# Patient Record
Sex: FEMALE | Race: WHITE | NOT HISPANIC OR LATINO | Employment: OTHER | URBAN - METROPOLITAN AREA
[De-identification: names, ages, dates, MRNs, and addresses within clinical notes are randomized per-mention and may not be internally consistent; named-entity substitution may affect disease eponyms.]

---

## 2017-01-03 DIAGNOSIS — I10 BENIGN ESSENTIAL HYPERTENSION: ICD-10-CM

## 2017-01-03 RX ORDER — AMLODIPINE AND OLMESARTAN MEDOXOMIL 10; 40 MG/1; MG/1
TABLET ORAL
Qty: 90 TABLET | Refills: 1 | Status: SHIPPED | OUTPATIENT
Start: 2017-01-03 | End: 2017-07-02 | Stop reason: SDUPTHER

## 2017-01-09 RX ORDER — BLOOD SUGAR DIAGNOSTIC
STRIP MISCELLANEOUS
Qty: 100 EACH | Refills: 1 | Status: SHIPPED | OUTPATIENT
Start: 2017-01-09 | End: 2020-07-08

## 2017-01-18 ENCOUNTER — OFFICE VISIT (OUTPATIENT)
Dept: INTERNAL MEDICINE | Facility: CLINIC | Age: 67
End: 2017-01-18

## 2017-01-18 VITALS
SYSTOLIC BLOOD PRESSURE: 118 MMHG | DIASTOLIC BLOOD PRESSURE: 74 MMHG | HEIGHT: 60 IN | HEART RATE: 68 BPM | BODY MASS INDEX: 32.83 KG/M2 | OXYGEN SATURATION: 98 % | WEIGHT: 167.2 LBS

## 2017-01-18 DIAGNOSIS — E78.2 MIXED HYPERLIPIDEMIA: ICD-10-CM

## 2017-01-18 DIAGNOSIS — K76.0 NONALCOHOLIC FATTY LIVER DISEASE: Primary | ICD-10-CM

## 2017-01-18 DIAGNOSIS — E11.9 DIABETES MELLITUS WITHOUT COMPLICATION (HCC): ICD-10-CM

## 2017-01-18 DIAGNOSIS — I10 BENIGN ESSENTIAL HYPERTENSION: ICD-10-CM

## 2017-01-18 DIAGNOSIS — E11.69 TYPE 2 DIABETES MELLITUS WITH OTHER SPECIFIED COMPLICATION (HCC): ICD-10-CM

## 2017-01-18 LAB
ALBUMIN SERPL-MCNC: 4.7 G/DL (ref 3.2–4.8)
ALBUMIN/GLOB SERPL: 1.8 G/DL (ref 1.5–2.5)
ALP SERPL-CCNC: 68 U/L (ref 25–100)
ALT SERPL W P-5'-P-CCNC: 15 U/L (ref 7–40)
ANION GAP SERPL CALCULATED.3IONS-SCNC: 12 MMOL/L (ref 3–11)
ARTICHOKE IGE QN: 65 MG/DL (ref 0–130)
AST SERPL-CCNC: 16 U/L (ref 0–33)
BILIRUB SERPL-MCNC: 0.7 MG/DL (ref 0.3–1.2)
BUN BLD-MCNC: 21 MG/DL (ref 9–23)
BUN/CREAT SERPL: 21 (ref 7–25)
CALCIUM SPEC-SCNC: 11.2 MG/DL (ref 8.7–10.4)
CHLORIDE SERPL-SCNC: 100 MMOL/L (ref 99–109)
CHOLEST SERPL-MCNC: 161 MG/DL (ref 0–200)
CO2 SERPL-SCNC: 29 MMOL/L (ref 20–31)
CREAT BLD-MCNC: 1 MG/DL (ref 0.6–1.3)
EXPIRATION DATE: ABNORMAL
GFR SERPL CREATININE-BSD FRML MDRD: 55 ML/MIN/1.73
GLOBULIN UR ELPH-MCNC: 2.6 GM/DL
GLUCOSE BLD-MCNC: 104 MG/DL (ref 70–100)
HBA1C MFR BLD: 5.4 %
HDLC SERPL-MCNC: 66 MG/DL (ref 40–60)
Lab: ABNORMAL
POTASSIUM BLD-SCNC: 4.5 MMOL/L (ref 3.5–5.5)
PROT SERPL-MCNC: 7.3 G/DL (ref 5.7–8.2)
SODIUM BLD-SCNC: 141 MMOL/L (ref 132–146)
TRIGL SERPL-MCNC: 115 MG/DL (ref 0–150)
TSH SERPL DL<=0.05 MIU/L-ACNC: 4.28 MIU/ML (ref 0.35–5.35)

## 2017-01-18 PROCEDURE — 80061 LIPID PANEL: CPT | Performed by: INTERNAL MEDICINE

## 2017-01-18 PROCEDURE — 80053 COMPREHEN METABOLIC PANEL: CPT | Performed by: INTERNAL MEDICINE

## 2017-01-18 PROCEDURE — 84443 ASSAY THYROID STIM HORMONE: CPT | Performed by: INTERNAL MEDICINE

## 2017-01-18 PROCEDURE — 83036 HEMOGLOBIN GLYCOSYLATED A1C: CPT | Performed by: INTERNAL MEDICINE

## 2017-01-18 PROCEDURE — 99214 OFFICE O/P EST MOD 30 MIN: CPT | Performed by: INTERNAL MEDICINE

## 2017-01-18 RX ORDER — METFORMIN HYDROCHLORIDE 500 MG/1
500 TABLET, EXTENDED RELEASE ORAL
Qty: 90 TABLET | Refills: 1 | Status: SHIPPED | OUTPATIENT
Start: 2017-01-18 | End: 2017-12-15 | Stop reason: SDUPTHER

## 2017-01-18 NOTE — MR AVS SNAPSHOT
Claire M Jose J   1/18/2017 11:30 AM   Office Visit    Dept Phone:  [phone]   Encounter #:  [id number]    Provider:  Rosemarie Marley MD   Department:  [facility] INTERNAL MEDICINE AND ENDOCRINOLOGY [place]                Your Full Care Plan              Where to Get Your Medications      These medications were sent to [facility] - [place], [place] - [address] [place] [address] - [phone]  - [phone] [address] [address]     Phone:  [phone]     metFORMIN  MG 24 hr tablet            Your Updated Medication List          This list is accurate as of: 1/18/17 12:25 PM.  Always use your most recent med list.                * ACCU-CHEK TINO PLUS test strip   Generic drug:  glucose blood       * ACCU-CHEK TINO PLUS test strip   Generic drug:  glucose blood   USE TO TEST DAILY       amlodipine-olmesartan 10-40 MG per tablet   Commonly known as:  JENNI   TAKE 1 TABLET DAILY FOR BLOOD PRESSURE       EQL FISH OIL 1000 MG capsule       metFORMIN  MG 24 hr tablet   Commonly known as:  GLUCOPHAGE-XR   Take 1 tablet by mouth Daily With Breakfast.       metoprolol succinate  MG 24 hr tablet   Commonly known as:  TOPROL-XL   Take 1 tablet by mouth Daily.       MULTI VITAMIN/MINERALS PO       naltrexone-bupropion ER 8-90 MG tablet   Commonly known as:  CONTRAVE   Take 1 tablet by mouth 2 (two) times a day.       niacin 500 MG tablet       pravastatin 40 MG tablet   Commonly known as:  PRAVACHOL   Take 1 tablet by mouth Daily.       psyllium 0.52 G capsule   Commonly known as:  METAMUCIL       ursodiol 300 MG capsule   Commonly known as:  ACTIGALL       Vitamin D-3 1000 UNITS capsule       Vitamin E 400 UNITS tablet       * Notice:  This list has 2 medication(s) that are the same as other medications prescribed for you. Read the directions carefully, and ask your doctor or other care provider to review them with you.            We  Performed the Following     Comprehensive Metabolic Panel     Lipid Panel     POC Glycated Hemoglobin, Total     TSH       You Were Diagnosed With        Codes Comments    Nonalcoholic fatty liver disease    -  Primary ICD-10-CM: K76.0  ICD-9-CM: 571.8     Diabetes mellitus without complication     ICD-10-CM: E11.9  ICD-9-CM: 250.00     Mixed hyperlipidemia     ICD-10-CM: E78.2  ICD-9-CM: 272.2     Benign essential hypertension     ICD-10-CM: I10  ICD-9-CM: 401.1     Type 2 diabetes mellitus with other specified complication     ICD-10-CM: E11.69  ICD-9-CM: 250.80       Instructions     None    Patient Instructions History      Upcoming Appointments     Visit Type Date Time Department    FOLLOW UP 2017 11:30 AM [facility]    SUBSEQUENT MEDICARE WELLNESS 2017 10:45 AM [facility] Signup     [facility] [employer] allows you to send messages to your doctor, view your test results, renew your prescriptions, schedule appointments, and more. To sign up, go to [email or web address] and click on the Sign Up Now link in the New User? box. Enter your [employer] Activation Code exactly as it appears below along with the last four digits of your Social Security Number and your Date of Birth () to complete the sign-up process. If you do not sign up before the expiration date, you must request a new code.    [employer] Activation Code: [email or web address]  Expires: 2017 12:25 PM    If you have questions, you can email [email or web address] or call [phone] to talk to our [employer] staff. Remember, [employer] is NOT to be used for urgent needs. For medical emergencies, dial 911.               Other Info from Your Visit           Your Appointments     2017 10:45 AM EDT   Subsequent Medicare Wellness with Rosemarie Marley MD   [facility] INTERNAL MEDICINE AND ENDOCRINOLOGY [place] (--)    [address] [address]   [phone]              Allergies     No  "Known Allergies      Reason for Visit     Hypertension     Diabetes           Vital Signs     Blood Pressure Pulse Height Weight Oxygen Saturation Body Mass Index    118/74 68 60\" (152.4 cm) 167 lb 3.2 oz (75.8 kg) 98% 32.65 kg/m2    Smoking Status                   Never Smoker           Problems and Diagnoses Noted     Benign essential hypertension    High cholesterol or triglycerides    Nonalcoholic fatty liver disease    Type 2 diabetes    Diabetes mellitus without complication          Results     POC Glycated Hemoglobin, Total      Component Value Standard Range & Units    Hemoglobin A1C 5.4 %    Lot Number [id number]     Expiration Date 6/2018                     "

## 2017-01-18 NOTE — PROGRESS NOTES
Hypertension and Diabetes    Subjective   Claire Lux is a 66 y.o. female is here today for follow-up.    Hypertension   This is a chronic problem. The current episode started more than 1 year ago. The problem is controlled. Pertinent negatives include no chest pain, headaches, palpitations or shortness of breath. Past treatments include ACE inhibitors and calcium channel blockers.   Hyperlipidemia   This is a chronic problem. The problem is controlled. Recent lipid tests were reviewed and are normal. Pertinent negatives include no chest pain, myalgias or shortness of breath. Current antihyperlipidemic treatment includes statins.   Diabetes   She presents for her follow-up diabetic visit. She has type 2 diabetes mellitus. Her disease course has been fluctuating. There are no hypoglycemic associated symptoms. Pertinent negatives for hypoglycemia include no confusion, dizziness or headaches. Pertinent negatives for diabetes include no chest pain. There are no diabetic complications.          Current Outpatient Prescriptions:   •  ACCU-CHEK TINO PLUS test strip, USE TO TEST DAILY, Disp: 100 each, Rfl: 1  •  amlodipine-olmesartan (JENNI) 10-40 MG per tablet, TAKE 1 TABLET DAILY FOR BLOOD PRESSURE, Disp: 90 tablet, Rfl: 1  •  Cholecalciferol (VITAMIN D-3) 1000 UNITS capsule, Take 2 tablets by mouth daily., Disp: , Rfl:   •  glucose blood (ACCU-CHEK TINO PLUS) test strip, Accu-Chek Tino Plus In Vitro Strip; Patient Sig: Accu-Chek Tino Plus In Vitro Strip USE TO TEST DAILY; 1; 0; 09-Nov-2012; Active, Disp: , Rfl:   •  metFORMIN XR (GLUCOPHAGE-XR) 500 MG 24 hr tablet, Take 1 tablet by mouth Daily With Breakfast., Disp: 90 tablet, Rfl: 1  •  metoprolol succinate XL (TOPROL-XL) 100 MG 24 hr tablet, Take 1 tablet by mouth Daily., Disp: 90 tablet, Rfl: 1  •  Multiple Vitamins-Minerals (MULTI VITAMIN/MINERALS PO), Take 1 tablet by mouth daily., Disp: , Rfl:   •  naltrexone-bupropion ER (CONTRAVE) 8-90 MG tablet, Take 1  "tablet by mouth 2 (two) times a day. (Patient taking differently: Take 2 tablets by mouth 2 (Two) Times a Day.), Disp: , Rfl:   •  niacin 500 MG tablet, Take 2 tablets by mouth 2 (two) times a day., Disp: , Rfl:   •  Omega-3 Fatty Acids (EQL FISH OIL) 1000 MG capsule, Take  by mouth daily., Disp: , Rfl:   •  pravastatin (PRAVACHOL) 40 MG tablet, Take 1 tablet by mouth Daily., Disp: 90 tablet, Rfl: 1  •  psyllium (METAMUCIL) 0.52 G capsule, Take 3 capsules by mouth 2 (two) times a day., Disp: , Rfl:   •  ursodiol (ACTIGALL) 300 MG capsule, Take 2 capsules by mouth 2 (two) times a day., Disp: , Rfl:   •  Vitamin E 400 UNITS tablet, Take 1 tablet by mouth daily., Disp: , Rfl:       The following portions of the patient's history were reviewed and updated as appropriate: allergies, current medications, past family history, past medical history, past social history, past surgical history and problem list.    Review of Systems   Constitutional: Negative.  Negative for chills and fever.   HENT: Negative for ear discharge, ear pain, sinus pressure and sore throat.    Respiratory: Negative for cough, chest tightness and shortness of breath.    Cardiovascular: Negative for chest pain, palpitations and leg swelling.   Gastrointestinal: Negative for diarrhea, nausea and vomiting.   Musculoskeletal: Negative for arthralgias, back pain and myalgias.   Neurological: Negative for dizziness, syncope and headaches.   Psychiatric/Behavioral: Negative for confusion and sleep disturbance.       Objective   Visit Vitals   • /74   • Pulse 68   • Ht 60\" (152.4 cm)   • Wt 167 lb 3.2 oz (75.8 kg)   • SpO2 98%  Comment: ra   • BMI 32.65 kg/m2     Physical Exam   Constitutional: She is oriented to person, place, and time. She appears well-developed and well-nourished.   HENT:   Head: Normocephalic and atraumatic.   Mouth/Throat: No oropharyngeal exudate.   Eyes: Conjunctivae are normal. Pupils are equal, round, and reactive to light. "   Neck: Neck supple. No thyromegaly present.   Cardiovascular: Normal rate and regular rhythm.    Pulmonary/Chest: Effort normal and breath sounds normal.   Abdominal: Soft. Bowel sounds are normal. She exhibits no distension. There is no tenderness.   Musculoskeletal: She exhibits no edema.   Neurological: She is alert and oriented to person, place, and time. No cranial nerve deficit.   Skin: Skin is warm and dry.   Psychiatric: She has a normal mood and affect. Judgment normal.   Nursing note and vitals reviewed.        Results for orders placed or performed in visit on 10/13/16   POC Glycated Hemoglobin, Total   Result Value Ref Range    Hemoglobin A1C 5.3 %    Lot Number [id number]     Expiration Date 3/2018              Assessment/Plan   Diagnoses and all orders for this visit:    Nonalcoholic fatty liver disease  -     Comprehensive Metabolic Panel    Diabetes mellitus without complication  -     metFORMIN XR (GLUCOPHAGE-XR) 500 MG 24 hr tablet; Take 1 tablet by mouth Daily With Breakfast.    Mixed hyperlipidemia  -     Comprehensive Metabolic Panel  -     Lipid Panel  -     TSH    Benign essential hypertension      Sugars excellent, continue current regimen.  Counselled to increase exercise as she does have weight gain.  NAFL monitored by Dr. Mccall, and had Hep C checked as a part of that workup.           Return in about 5 months (around 6/18/2017) for [facility] wellness.

## 2017-04-17 RX ORDER — PRAVASTATIN SODIUM 40 MG
TABLET ORAL
Qty: 90 TABLET | Refills: 0 | Status: SHIPPED | OUTPATIENT
Start: 2017-04-17 | End: 2017-07-16 | Stop reason: SDUPTHER

## 2017-06-11 DIAGNOSIS — I10 BENIGN ESSENTIAL HYPERTENSION: ICD-10-CM

## 2017-06-12 RX ORDER — METOPROLOL SUCCINATE 100 MG/1
TABLET, EXTENDED RELEASE ORAL
Qty: 90 TABLET | Refills: 0 | Status: SHIPPED | OUTPATIENT
Start: 2017-06-12 | End: 2017-09-10 | Stop reason: SDUPTHER

## 2017-07-02 DIAGNOSIS — I10 BENIGN ESSENTIAL HYPERTENSION: ICD-10-CM

## 2017-07-03 RX ORDER — AMLODIPINE AND OLMESARTAN MEDOXOMIL 10; 40 MG/1; MG/1
TABLET ORAL
Qty: 90 TABLET | Refills: 0 | Status: SHIPPED | OUTPATIENT
Start: 2017-07-03 | End: 2017-10-01 | Stop reason: SDUPTHER

## 2017-07-17 RX ORDER — PRAVASTATIN SODIUM 40 MG
TABLET ORAL
Qty: 90 TABLET | Refills: 0 | Status: SHIPPED | OUTPATIENT
Start: 2017-07-17 | End: 2017-10-15 | Stop reason: SDUPTHER

## 2017-09-10 DIAGNOSIS — I10 BENIGN ESSENTIAL HYPERTENSION: ICD-10-CM

## 2017-09-11 RX ORDER — METOPROLOL SUCCINATE 100 MG/1
TABLET, EXTENDED RELEASE ORAL
Qty: 90 TABLET | Refills: 0 | Status: SHIPPED | OUTPATIENT
Start: 2017-09-11 | End: 2017-12-10 | Stop reason: SDUPTHER

## 2017-09-21 ENCOUNTER — OFFICE VISIT (OUTPATIENT)
Dept: INTERNAL MEDICINE | Facility: CLINIC | Age: 67
End: 2017-09-21

## 2017-09-21 VITALS
BODY MASS INDEX: 35.35 KG/M2 | HEART RATE: 78 BPM | DIASTOLIC BLOOD PRESSURE: 78 MMHG | WEIGHT: 181 LBS | OXYGEN SATURATION: 99 % | SYSTOLIC BLOOD PRESSURE: 132 MMHG

## 2017-09-21 DIAGNOSIS — K76.0 NONALCOHOLIC FATTY LIVER DISEASE: ICD-10-CM

## 2017-09-21 DIAGNOSIS — E11.9 CONTROLLED TYPE 2 DIABETES MELLITUS WITHOUT COMPLICATION, WITHOUT LONG-TERM CURRENT USE OF INSULIN (HCC): Primary | ICD-10-CM

## 2017-09-21 DIAGNOSIS — E11.69 TYPE 2 DIABETES MELLITUS WITH OTHER SPECIFIED COMPLICATION (HCC): ICD-10-CM

## 2017-09-21 DIAGNOSIS — E78.2 MIXED HYPERLIPIDEMIA: ICD-10-CM

## 2017-09-21 DIAGNOSIS — E55.9 VITAMIN D DEFICIENCY: ICD-10-CM

## 2017-09-21 DIAGNOSIS — I10 BENIGN ESSENTIAL HYPERTENSION: ICD-10-CM

## 2017-09-21 DIAGNOSIS — E83.52 HYPERCALCEMIA: ICD-10-CM

## 2017-09-21 LAB
25(OH)D3 SERPL-MCNC: 58.1 NG/ML
ALBUMIN SERPL-MCNC: 4.8 G/DL (ref 3.2–4.8)
ALBUMIN/GLOB SERPL: 1.9 G/DL (ref 1.5–2.5)
ALP SERPL-CCNC: 74 U/L (ref 25–100)
ALT SERPL W P-5'-P-CCNC: 22 U/L (ref 7–40)
ANION GAP SERPL CALCULATED.3IONS-SCNC: 13 MMOL/L (ref 3–11)
ARTICHOKE IGE QN: 81 MG/DL (ref 0–130)
AST SERPL-CCNC: 22 U/L (ref 0–33)
BILIRUB BLD-MCNC: NEGATIVE MG/DL
BILIRUB SERPL-MCNC: 0.6 MG/DL (ref 0.3–1.2)
BUN BLD-MCNC: 17 MG/DL (ref 9–23)
BUN/CREAT SERPL: 18.9 (ref 7–25)
CALCIUM SPEC-SCNC: 10 MG/DL (ref 8.7–10.4)
CHLORIDE SERPL-SCNC: 103 MMOL/L (ref 99–109)
CHOLEST SERPL-MCNC: 164 MG/DL (ref 0–200)
CLARITY, POC: CLEAR
CO2 SERPL-SCNC: 25 MMOL/L (ref 20–31)
COLOR UR: YELLOW
CREAT BLD-MCNC: 0.9 MG/DL (ref 0.6–1.3)
DEPRECATED RDW RBC AUTO: 42.3 FL (ref 37–54)
ERYTHROCYTE [DISTWIDTH] IN BLOOD BY AUTOMATED COUNT: 12.9 % (ref 11.3–14.5)
GFR SERPL CREATININE-BSD FRML MDRD: 62 ML/MIN/1.73
GLOBULIN UR ELPH-MCNC: 2.5 GM/DL
GLUCOSE BLD-MCNC: 119 MG/DL (ref 70–100)
GLUCOSE BLDC GLUCOMTR-MCNC: 136 MG/DL (ref 70–130)
GLUCOSE UR STRIP-MCNC: NEGATIVE MG/DL
HBA1C MFR BLD: 5.9 %
HCT VFR BLD AUTO: 43.8 % (ref 34.5–44)
HDLC SERPL-MCNC: 62 MG/DL (ref 40–60)
HGB BLD-MCNC: 14.8 G/DL (ref 11.5–15.5)
KETONES UR QL: NEGATIVE
LEUKOCYTE EST, POC: NEGATIVE
MCH RBC QN AUTO: 30.3 PG (ref 27–31)
MCHC RBC AUTO-ENTMCNC: 33.8 G/DL (ref 32–36)
MCV RBC AUTO: 89.8 FL (ref 80–99)
NITRITE UR-MCNC: NEGATIVE MG/ML
PH UR: 8 [PH] (ref 5–8)
PLATELET # BLD AUTO: 277 10*3/MM3 (ref 150–450)
PMV BLD AUTO: 11.1 FL (ref 6–12)
POTASSIUM BLD-SCNC: 4.4 MMOL/L (ref 3.5–5.5)
PROT SERPL-MCNC: 7.3 G/DL (ref 5.7–8.2)
PROT UR STRIP-MCNC: NEGATIVE MG/DL
RBC # BLD AUTO: 4.88 10*6/MM3 (ref 3.89–5.14)
RBC # UR STRIP: NEGATIVE /UL
SODIUM BLD-SCNC: 141 MMOL/L (ref 132–146)
SP GR UR: 1.01 (ref 1–1.03)
TRIGL SERPL-MCNC: 162 MG/DL (ref 0–150)
TSH SERPL DL<=0.05 MIU/L-ACNC: 2.84 MIU/ML (ref 0.35–5.35)
UROBILINOGEN UR QL: NORMAL
WBC NRBC COR # BLD: 7.22 10*3/MM3 (ref 3.5–10.8)

## 2017-09-21 PROCEDURE — 80061 LIPID PANEL: CPT | Performed by: INTERNAL MEDICINE

## 2017-09-21 PROCEDURE — 82947 ASSAY GLUCOSE BLOOD QUANT: CPT | Performed by: INTERNAL MEDICINE

## 2017-09-21 PROCEDURE — 82306 VITAMIN D 25 HYDROXY: CPT | Performed by: INTERNAL MEDICINE

## 2017-09-21 PROCEDURE — 80053 COMPREHEN METABOLIC PANEL: CPT | Performed by: INTERNAL MEDICINE

## 2017-09-21 PROCEDURE — G0439 PPPS, SUBSEQ VISIT: HCPCS | Performed by: INTERNAL MEDICINE

## 2017-09-21 PROCEDURE — 99213 OFFICE O/P EST LOW 20 MIN: CPT | Performed by: INTERNAL MEDICINE

## 2017-09-21 PROCEDURE — 81003 URINALYSIS AUTO W/O SCOPE: CPT | Performed by: INTERNAL MEDICINE

## 2017-09-21 PROCEDURE — G0008 ADMIN INFLUENZA VIRUS VAC: HCPCS | Performed by: INTERNAL MEDICINE

## 2017-09-21 PROCEDURE — 85027 COMPLETE CBC AUTOMATED: CPT | Performed by: INTERNAL MEDICINE

## 2017-09-21 PROCEDURE — 90662 IIV NO PRSV INCREASED AG IM: CPT | Performed by: INTERNAL MEDICINE

## 2017-09-21 PROCEDURE — G0444 DEPRESSION SCREEN ANNUAL: HCPCS | Performed by: INTERNAL MEDICINE

## 2017-09-21 PROCEDURE — 83036 HEMOGLOBIN GLYCOSYLATED A1C: CPT | Performed by: INTERNAL MEDICINE

## 2017-09-21 PROCEDURE — 84443 ASSAY THYROID STIM HORMONE: CPT | Performed by: INTERNAL MEDICINE

## 2017-09-21 NOTE — PROGRESS NOTES
QUICK REFERENCE INFORMATION:  The ABCs of the Annual Wellness Visit    Subsequent Medicare Wellness Visit    HEALTH RISK ASSESSMENT    1950    Recent Hospitalizations:  No hospitalization(s) within the last year..        Current Medical Providers:  Patient Care Team:  Rosemarie Marley MD as PCP - General  Rosemarie Marley MD as PCP - Claims Attributed        Smoking Status:  History   Smoking Status   • Never Smoker   Smokeless Tobacco   • Never Used       Alcohol Consumption:  History   Alcohol Use No       Depression Screen:   PHQ-2/PHQ-9 Depression Screening 9/21/2017   Little interest or pleasure in doing things 0   Feeling down, depressed, or hopeless 0   Total Score 0       Health Habits and Functional and Cognitive Screening:  Functional & Cognitive Status 9/21/2017   Do you have difficulty preparing food and eating? No   Do you have difficulty bathing yourself? No   Do you have difficulty getting dressed? No   Do you have difficulty using the toilet? No   Do you have difficulty moving around from place to place? No   In the past year have you fallen or experienced a near fall? No   Do you need help using the phone?  No   Are you deaf or do you have serious difficulty hearing?  No   Do you need help with transportation? No   Do you need help shopping? No   Do you need help preparing meals?  No   Do you need help with housework?  No   Do you need help with laundry? No   Do you need help taking your medications? No   Do you need help managing money? No       Health Habits  Current Diet: Well Balanced Diet  Dental Exam: Up to date  Eye Exam: Up to date  Exercise (times per week): 3 times per week  Current Exercise Activities Include: Walking      Does the patient have evidence of cognitive impairment? No    Aspirin use counseling: Start ASA 81 mg daily       Recent Lab Results:  CMP:  Lab Results   Component Value Date    BUN 17 09/21/2017    CREATININE 0.90 09/21/2017    EGFRIFNONA 62 09/21/2017    BCR  18.9 09/21/2017     09/21/2017    K 4.4 09/21/2017    CO2 25.0 09/21/2017    CALCIUM 10.0 09/21/2017    ALBUMIN 4.80 09/21/2017    LABIL2 1.9 09/21/2017    BILITOT 0.6 09/21/2017    ALKPHOS 74 09/21/2017    AST 22 09/21/2017    ALT 22 09/21/2017     Lipid Panel:  Lab Results   Component Value Date    CHOL 164 09/21/2017    TRIG 162 (H) 09/21/2017    HDL 62 (H) 09/21/2017    LDLDIRECT 81 09/21/2017     HbA1c:  Lab Results   Component Value Date    HGBA1C 5.9 09/21/2017       Visual Acuity:  No exam data present    Age-appropriate Screening Schedule:  Refer to the list below for future screening recommendations based on patient's age, sex and/or medical conditions. Orders for these recommended tests are listed in the plan section. The patient has been provided with a written plan.    Health Maintenance   Topic Date Due   • HEMOGLOBIN A1C  03/21/2018   • DIABETIC EYE EXAM  06/10/2018   • MAMMOGRAM  07/21/2018   • DIABETIC FOOT EXAM  09/21/2018   • LIPID PANEL  09/21/2018   • URINE MICROALBUMIN  09/21/2018   • COLONOSCOPY  08/10/2020   • INFLUENZA VACCINE  Completed   • PNEUMOCOCCAL VACCINES (65+ LOW/MEDIUM RISK)  Completed   • ZOSTER VACCINE  Addressed   • TDAP/TD VACCINES  Excluded        Subjective   History of Present Illness    Claire Lux is a 66 y.o. female who presents for an Subsequent Wellness Visit.and f/u on her DM2, HTN, HLP and SMALLS.    The following portions of the patient's history were reviewed and updated as appropriate: allergies, current medications, past family history, past medical history, past social history, past surgical history and problem list.    Outpatient Medications Prior to Visit   Medication Sig Dispense Refill   • ACCU-CHEK TINO PLUS test strip USE TO TEST DAILY 100 each 1   • amlodipine-olmesartan (JENNI) 10-40 MG per tablet TAKE 1 TABLET DAILY FOR BLOOD PRESSURE 90 tablet 0   • Cholecalciferol (VITAMIN D-3) 1000 UNITS capsule Take 2 tablets by mouth daily.     • glucose  blood (ACCU-CHEK TINO PLUS) test strip Accu-Chek Tino Plus In Vitro Strip; Patient Sig: Accu-Chek Tino Plus In Vitro Strip USE TO TEST DAILY; 1; 0; 09-Nov-2012; Active     • metFORMIN XR (GLUCOPHAGE-XR) 500 MG 24 hr tablet Take 1 tablet by mouth Daily With Breakfast. 90 tablet 1   • metoprolol succinate XL (TOPROL-XL) 100 MG 24 hr tablet TAKE 1 TABLET DAILY 90 tablet 0   • Multiple Vitamins-Minerals (MULTI VITAMIN/MINERALS PO) Take 1 tablet by mouth daily.     • naltrexone-bupropion ER (CONTRAVE) 8-90 MG tablet Take 1 tablet by mouth 2 (two) times a day. (Patient taking differently: Take 2 tablets by mouth 2 (Two) Times a Day.)     • niacin 500 MG tablet Take 2 tablets by mouth 2 (two) times a day.     • Omega-3 Fatty Acids (EQL FISH OIL) 1000 MG capsule Take  by mouth daily.     • pravastatin (PRAVACHOL) 40 MG tablet TAKE 1 TABLET DAILY 90 tablet 0   • psyllium (METAMUCIL) 0.52 G capsule Take 3 capsules by mouth 2 (two) times a day.     • ursodiol (ACTIGALL) 300 MG capsule Take 2 capsules by mouth 2 (two) times a day.     • Vitamin E 400 UNITS tablet Take 1 tablet by mouth daily.       No facility-administered medications prior to visit.        Patient Active Problem List   Diagnosis   • Abnormal weight gain   • Benign essential hypertension   • Hypercalcemia   • Hyperlipidemia   • Nonalcoholic fatty liver disease   • Type 2 diabetes mellitus   • Postmenopausal disorder       Advance Care Planning:  has an advance directive - a copy has been provided and is in file    Identification of Risk Factors:  Risk factors include: weight , increased fall risk, cognitive impairment, hearing limitations and incontinence.    Review of Systems   Constitutional: Negative.  Negative for chills and fever.   HENT: Negative for ear discharge, ear pain, sinus pressure and sore throat.    Respiratory: Negative for cough, chest tightness and shortness of breath.    Cardiovascular: Negative for chest pain, palpitations and leg  swelling.   Gastrointestinal: Negative for diarrhea, nausea and vomiting.   Musculoskeletal: Negative for arthralgias, back pain and myalgias.   Neurological: Negative for dizziness, syncope and headaches.   Psychiatric/Behavioral: Negative for confusion and sleep disturbance.       Compared to one year ago, the patient feels her physical health is better.  Compared to one year ago, the patient feels her mental health is better.    Objective     Physical Exam   Constitutional: She is oriented to person, place, and time. She appears well-developed and well-nourished.   HENT:   Head: Normocephalic and atraumatic.   Right Ear: External ear normal.   Left Ear: External ear normal.   Mouth/Throat: No oropharyngeal exudate.   Eyes: Conjunctivae are normal. Pupils are equal, round, and reactive to light.   Neck: Neck supple. No thyromegaly present.   Cardiovascular: Normal rate, regular rhythm and intact distal pulses.    Pulmonary/Chest: Effort normal and breath sounds normal.   Abdominal: Soft. Bowel sounds are normal. She exhibits no distension. There is no tenderness.   Musculoskeletal: She exhibits no edema.   Neurological: She is alert and oriented to person, place, and time. No cranial nerve deficit.   Skin: Skin is warm and dry.   Psychiatric: She has a normal mood and affect. Judgment normal.   Nursing note and vitals reviewed.      Vitals:    09/21/17 1139   BP: 132/78   Pulse: 78   SpO2: 99%   Weight: 181 lb (82.1 kg)       Body mass index is 35.35 kg/(m^2).  Discussed the patient's BMI with her. The BMI is in the acceptable range.    Claire was seen today for subsequent medicare wellness.    Diagnoses and all orders for this visit:    Controlled type 2 diabetes mellitus without complication, without long-term current use of insulin  -     POC Glycosylated Hemoglobin (Hb A1C)  -     POCT Glucose  -     POC Urinalysis Dipstick, Automated    Benign essential hypertension  -     POC Urinalysis Dipstick,  Automated    Mixed hyperlipidemia  -     Comprehensive Metabolic Panel  -     Lipid Panel  -     TSH  -     POC Urinalysis Dipstick, Automated    Nonalcoholic fatty liver disease  -     CBC (No Diff)  -     Comprehensive Metabolic Panel  -     POC Urinalysis Dipstick, Automated    Type 2 diabetes mellitus with other specified complication  -     POC Microalbumin  -     POC Urinalysis Dipstick, Automated    Hypercalcemia  -     Vitamin D 25 Hydroxy  -     POC Urinalysis Dipstick, Automated    Vitamin D deficiency  -     Vitamin D 25 Hydroxy  -     POC Urinalysis Dipstick, Automated    Other orders  -     Flu Vaccine High Dose PF 65YR+      Assessment/Plan   Patient Self-Management and Personalized Health Advice  The patient has been provided with information about: diet, exercise, weight management, designing advance directives and supplements and preventive services including:   · Diabetes screening, see lab orders, Exercise counseling provided, Fall Risk plan of care done, Glaucoma screening recommended, Influenza vaccine, Nutrition counseling provided, Urinary Incontinence assessment done.    Visit Diagnoses:    ICD-10-CM ICD-9-CM   1. Controlled type 2 diabetes mellitus without complication, without long-term current use of insulin E11.9 250.00   2. Benign essential hypertension I10 401.1   3. Mixed hyperlipidemia E78.2 272.2   4. Nonalcoholic fatty liver disease K76.0 571.8   5. Type 2 diabetes mellitus with other specified complication E11.69 250.80   6. Hypercalcemia E83.52 275.42   7. Vitamin D deficiency E55.9 268.9       Orders Placed This Encounter   Procedures   • Flu Vaccine High Dose PF 65YR+   • CBC (No Diff)   • Comprehensive Metabolic Panel   • Lipid Panel   • TSH   • Vitamin D 25 Hydroxy   • POC Glycosylated Hemoglobin (Hb A1C)   • POCT Glucose   • POC Microalbumin   • POC Urinalysis Dipstick, Automated       Outpatient Encounter Prescriptions as of 9/21/2017   Medication Sig Dispense Refill   •  ACCU-CHEK TINO PLUS test strip USE TO TEST DAILY 100 each 1   • amlodipine-olmesartan (JENNI) 10-40 MG per tablet TAKE 1 TABLET DAILY FOR BLOOD PRESSURE 90 tablet 0   • Cholecalciferol (VITAMIN D-3) 1000 UNITS capsule Take 2 tablets by mouth daily.     • glucose blood (ACCU-CHEK TINO PLUS) test strip Accu-Chek Tino Plus In Vitro Strip; Patient Sig: Accu-Chek Tino Plus In Vitro Strip USE TO TEST DAILY; 1; 0; 09-Nov-2012; Active     • metFORMIN XR (GLUCOPHAGE-XR) 500 MG 24 hr tablet Take 1 tablet by mouth Daily With Breakfast. 90 tablet 1   • metoprolol succinate XL (TOPROL-XL) 100 MG 24 hr tablet TAKE 1 TABLET DAILY 90 tablet 0   • Multiple Vitamins-Minerals (MULTI VITAMIN/MINERALS PO) Take 1 tablet by mouth daily.     • naltrexone-bupropion ER (CONTRAVE) 8-90 MG tablet Take 1 tablet by mouth 2 (two) times a day. (Patient taking differently: Take 2 tablets by mouth 2 (Two) Times a Day.)     • niacin 500 MG tablet Take 2 tablets by mouth 2 (two) times a day.     • Omega-3 Fatty Acids (EQL FISH OIL) 1000 MG capsule Take  by mouth daily.     • pravastatin (PRAVACHOL) 40 MG tablet TAKE 1 TABLET DAILY 90 tablet 0   • psyllium (METAMUCIL) 0.52 G capsule Take 3 capsules by mouth 2 (two) times a day.     • ursodiol (ACTIGALL) 300 MG capsule Take 2 capsules by mouth 2 (two) times a day.     • Vitamin E 400 UNITS tablet Take 1 tablet by mouth daily.       No facility-administered encounter medications on file as of 9/21/2017.        Reviewed use of high risk medication in the elderly: yes  Reviewed for potential of harmful drug interactions in the elderly: yes    Follow Up:  Return in about 5 months (around 2/21/2018) for Next scheduled follow up.     An After Visit Summary and PPPS with all of these plans were given to the patient.

## 2017-10-01 DIAGNOSIS — I10 BENIGN ESSENTIAL HYPERTENSION: ICD-10-CM

## 2017-10-01 RX ORDER — AMLODIPINE AND OLMESARTAN MEDOXOMIL 10; 40 MG/1; MG/1
TABLET ORAL
Qty: 90 TABLET | Refills: 0 | Status: SHIPPED | OUTPATIENT
Start: 2017-10-01 | End: 2017-12-30 | Stop reason: SDUPTHER

## 2017-10-15 RX ORDER — PRAVASTATIN SODIUM 40 MG
TABLET ORAL
Qty: 90 TABLET | Refills: 0 | Status: SHIPPED | OUTPATIENT
Start: 2017-10-15 | End: 2018-01-13 | Stop reason: SDUPTHER

## 2017-12-10 DIAGNOSIS — I10 BENIGN ESSENTIAL HYPERTENSION: ICD-10-CM

## 2017-12-10 RX ORDER — METOPROLOL SUCCINATE 100 MG/1
TABLET, EXTENDED RELEASE ORAL
Qty: 90 TABLET | Refills: 0 | Status: SHIPPED | OUTPATIENT
Start: 2017-12-10 | End: 2018-03-10 | Stop reason: SDUPTHER

## 2017-12-15 DIAGNOSIS — E11.9 DIABETES MELLITUS WITHOUT COMPLICATION (HCC): ICD-10-CM

## 2017-12-15 RX ORDER — METFORMIN HYDROCHLORIDE 500 MG/1
TABLET, EXTENDED RELEASE ORAL
Qty: 90 TABLET | Refills: 1 | Status: SHIPPED | OUTPATIENT
Start: 2017-12-15 | End: 2018-06-13 | Stop reason: SDUPTHER

## 2017-12-30 DIAGNOSIS — I10 BENIGN ESSENTIAL HYPERTENSION: ICD-10-CM

## 2017-12-30 RX ORDER — AMLODIPINE AND OLMESARTAN MEDOXOMIL 10; 40 MG/1; MG/1
TABLET ORAL
Qty: 90 TABLET | Refills: 0 | Status: SHIPPED | OUTPATIENT
Start: 2017-12-30 | End: 2018-04-27 | Stop reason: SDUPTHER

## 2018-01-14 RX ORDER — PRAVASTATIN SODIUM 40 MG
TABLET ORAL
Qty: 90 TABLET | Refills: 0 | Status: SHIPPED | OUTPATIENT
Start: 2018-01-14 | End: 2018-04-13 | Stop reason: SDUPTHER

## 2018-02-21 ENCOUNTER — OFFICE VISIT (OUTPATIENT)
Dept: INTERNAL MEDICINE | Facility: CLINIC | Age: 68
End: 2018-02-21

## 2018-02-21 VITALS
OXYGEN SATURATION: 99 % | WEIGHT: 184 LBS | SYSTOLIC BLOOD PRESSURE: 122 MMHG | HEART RATE: 74 BPM | HEIGHT: 60 IN | DIASTOLIC BLOOD PRESSURE: 84 MMHG | BODY MASS INDEX: 36.12 KG/M2

## 2018-02-21 DIAGNOSIS — K76.0 NONALCOHOLIC FATTY LIVER DISEASE: ICD-10-CM

## 2018-02-21 DIAGNOSIS — I10 BENIGN ESSENTIAL HYPERTENSION: ICD-10-CM

## 2018-02-21 DIAGNOSIS — E11.69 TYPE 2 DIABETES MELLITUS WITH OTHER SPECIFIED COMPLICATION, WITHOUT LONG-TERM CURRENT USE OF INSULIN (HCC): Primary | ICD-10-CM

## 2018-02-21 DIAGNOSIS — E78.2 MIXED HYPERLIPIDEMIA: ICD-10-CM

## 2018-02-21 LAB
ALBUMIN SERPL-MCNC: 4.6 G/DL (ref 3.2–4.8)
ALBUMIN/GLOB SERPL: 1.9 G/DL (ref 1.5–2.5)
ALP SERPL-CCNC: 69 U/L (ref 25–100)
ALT SERPL W P-5'-P-CCNC: 20 U/L (ref 7–40)
ANION GAP SERPL CALCULATED.3IONS-SCNC: 9 MMOL/L (ref 3–11)
ARTICHOKE IGE QN: 77 MG/DL (ref 0–130)
AST SERPL-CCNC: 20 U/L (ref 0–33)
BILIRUB SERPL-MCNC: 0.6 MG/DL (ref 0.3–1.2)
BUN BLD-MCNC: 21 MG/DL (ref 9–23)
BUN/CREAT SERPL: 23.3 (ref 7–25)
CALCIUM SPEC-SCNC: 10.1 MG/DL (ref 8.7–10.4)
CHLORIDE SERPL-SCNC: 105 MMOL/L (ref 99–109)
CHOLEST SERPL-MCNC: 152 MG/DL (ref 0–200)
CO2 SERPL-SCNC: 28 MMOL/L (ref 20–31)
CREAT BLD-MCNC: 0.9 MG/DL (ref 0.6–1.3)
GFR SERPL CREATININE-BSD FRML MDRD: 62 ML/MIN/1.73
GLOBULIN UR ELPH-MCNC: 2.4 GM/DL
GLUCOSE BLD-MCNC: 148 MG/DL (ref 70–100)
GLUCOSE BLDC GLUCOMTR-MCNC: 151 MG/DL (ref 70–130)
HBA1C MFR BLD: 5.4 %
HDLC SERPL-MCNC: 57 MG/DL (ref 40–60)
POTASSIUM BLD-SCNC: 4.3 MMOL/L (ref 3.5–5.5)
PROT SERPL-MCNC: 7 G/DL (ref 5.7–8.2)
SODIUM BLD-SCNC: 142 MMOL/L (ref 132–146)
TRIGL SERPL-MCNC: 166 MG/DL (ref 0–150)

## 2018-02-21 PROCEDURE — 80053 COMPREHEN METABOLIC PANEL: CPT | Performed by: INTERNAL MEDICINE

## 2018-02-21 PROCEDURE — 80061 LIPID PANEL: CPT | Performed by: INTERNAL MEDICINE

## 2018-02-21 PROCEDURE — 99214 OFFICE O/P EST MOD 30 MIN: CPT | Performed by: INTERNAL MEDICINE

## 2018-02-21 PROCEDURE — 83036 HEMOGLOBIN GLYCOSYLATED A1C: CPT | Performed by: INTERNAL MEDICINE

## 2018-02-21 PROCEDURE — 82947 ASSAY GLUCOSE BLOOD QUANT: CPT | Performed by: INTERNAL MEDICINE

## 2018-02-21 RX ORDER — PHENTERMINE HYDROCHLORIDE 37.5 MG/1
TABLET ORAL
Refills: 2 | COMMUNITY
Start: 2018-01-26 | End: 2018-10-24

## 2018-02-21 NOTE — PROGRESS NOTES
Follow-up (5 month); Diabetes (type 2); Hypertension; Hyperlipidemia; Nonalcoholic fatty liver disease; Abnormal Calcium; and Vitamin D Deficiency    Subjective   Claire Lux is a 67 y.o. female is here today for follow-up.    History of Present Illness   Follow up on her prediabetes. Non fasting, Glu was 96 this am.  She is on adipex for her weight due to her fatty liver.  Did not respond to contrave, hence on adipex by Dr. Mccall.    Current Outpatient Prescriptions:   •  ACCU-CHEK TINO PLUS test strip, USE TO TEST DAILY, Disp: 100 each, Rfl: 1  •  amlodipine-olmesartan (JENNI) 10-40 MG per tablet, TAKE 1 TABLET DAILY FOR BLOOD PRESSURE, Disp: 90 tablet, Rfl: 0  •  Cholecalciferol (VITAMIN D-3) 1000 UNITS capsule, Take 2 tablets by mouth daily., Disp: , Rfl:   •  glucose blood (ACCU-CHEK TINO PLUS) test strip, Accu-Chek Tino Plus In Vitro Strip; Patient Sig: Accu-Chek Tino Plus In Vitro Strip USE TO TEST DAILY; 1; 0; 09-Nov-2012; Active, Disp: , Rfl:   •  metFORMIN ER (GLUCOPHAGE-XR) 500 MG 24 hr tablet, TAKE 1 TABLET DAILY WITH BREAKFAST, Disp: 90 tablet, Rfl: 1  •  metoprolol succinate XL (TOPROL-XL) 100 MG 24 hr tablet, TAKE 1 TABLET DAILY, Disp: 90 tablet, Rfl: 0  •  Multiple Vitamins-Minerals (MULTI VITAMIN/MINERALS PO), Take 1 tablet by mouth daily., Disp: , Rfl:   •  niacin 500 MG tablet, Take 2 tablets by mouth 2 (two) times a day., Disp: , Rfl:   •  Omega-3 Fatty Acids (EQL FISH OIL) 1000 MG capsule, Take  by mouth daily., Disp: , Rfl:   •  pravastatin (PRAVACHOL) 40 MG tablet, TAKE 1 TABLET DAILY, Disp: 90 tablet, Rfl: 0  •  psyllium (METAMUCIL) 0.52 G capsule, Take 3 capsules by mouth 2 (two) times a day., Disp: , Rfl:   •  ursodiol (ACTIGALL) 300 MG capsule, Take 2 capsules by mouth 2 (two) times a day., Disp: , Rfl:   •  Vitamin E 400 UNITS tablet, Take 1 tablet by mouth daily., Disp: , Rfl:   •  phentermine (ADIPEX-P) 37.5 MG tablet, TAKE 1 TABLET DAILY, Disp: , Rfl: 2      The following  "portions of the patient's history were reviewed and updated as appropriate: allergies, current medications, past family history, past medical history, past social history, past surgical history and problem list.    Review of Systems   Constitutional: Positive for unexpected weight change. Negative for chills and fever.   HENT: Negative for ear discharge, ear pain, sinus pressure and sore throat.    Respiratory: Negative for cough, chest tightness and shortness of breath.    Cardiovascular: Negative for chest pain, palpitations and leg swelling.   Gastrointestinal: Negative for diarrhea, nausea and vomiting.   Musculoskeletal: Negative for arthralgias, back pain and myalgias.   Neurological: Negative for dizziness, syncope and headaches.   Psychiatric/Behavioral: Negative for confusion and sleep disturbance.       Objective   /84  Pulse 74  Ht 152.4 cm (60\")  Wt 83.5 kg (184 lb)  SpO2 99%  BMI 35.94 kg/m2  Physical Exam   Constitutional: She is oriented to person, place, and time. She appears well-developed and well-nourished.   HENT:   Head: Normocephalic and atraumatic.   Mouth/Throat: No oropharyngeal exudate.   Eyes: Conjunctivae are normal. Pupils are equal, round, and reactive to light.   Neck: Neck supple. No thyromegaly present.   Cardiovascular: Normal rate and regular rhythm.    Pulmonary/Chest: Effort normal and breath sounds normal.   Abdominal: Soft. Bowel sounds are normal. She exhibits no distension. There is no tenderness.   Musculoskeletal: She exhibits no edema.   Neurological: She is alert and oriented to person, place, and time. No cranial nerve deficit.   Skin: Skin is warm and dry.   Psychiatric: She has a normal mood and affect. Judgment normal.   Nursing note and vitals reviewed.        Results for orders placed or performed in visit on 02/21/18   POC Glycosylated Hemoglobin (Hb A1C)   Result Value Ref Range    Hemoglobin A1C 5.4 %   POC Glucose Fingerstick   Result Value Ref Range "    Glucose 151 (A) 70 - 130 mg/dL             Assessment/Plan   Diagnoses and all orders for this visit:    Type 2 diabetes mellitus with other specified complication, without long-term current use of insulin  -     POC Glycosylated Hemoglobin (Hb A1C)  -     POC Glucose Fingerstick    Nonalcoholic fatty liver disease  -     phentermine (ADIPEX-P) 37.5 MG tablet; TAKE 1 TABLET DAILY  -     Comprehensive Metabolic Panel    Benign essential hypertension    Mixed hyperlipidemia  -     Lipid Panel             Return in about 4 months (around 6/21/2018) for Next scheduled follow up and [facility] wellnesss in October..

## 2018-03-10 DIAGNOSIS — I10 BENIGN ESSENTIAL HYPERTENSION: ICD-10-CM

## 2018-03-10 RX ORDER — METOPROLOL SUCCINATE 100 MG/1
TABLET, EXTENDED RELEASE ORAL
Qty: 90 TABLET | Refills: 0 | Status: SHIPPED | OUTPATIENT
Start: 2018-03-10 | End: 2018-06-08 | Stop reason: SDUPTHER

## 2018-04-13 RX ORDER — PRAVASTATIN SODIUM 40 MG
TABLET ORAL
Qty: 90 TABLET | Refills: 0 | Status: SHIPPED | OUTPATIENT
Start: 2018-04-13 | End: 2018-07-12 | Stop reason: SDUPTHER

## 2018-04-27 DIAGNOSIS — I10 BENIGN ESSENTIAL HYPERTENSION: ICD-10-CM

## 2018-04-27 RX ORDER — AMLODIPINE AND OLMESARTAN MEDOXOMIL 10; 40 MG/1; MG/1
TABLET ORAL
Qty: 90 TABLET | Refills: 0 | Status: SHIPPED | OUTPATIENT
Start: 2018-04-27 | End: 2018-07-26 | Stop reason: SDUPTHER

## 2018-05-23 ENCOUNTER — OFFICE VISIT (OUTPATIENT)
Dept: INTERNAL MEDICINE | Facility: CLINIC | Age: 68
End: 2018-05-23

## 2018-05-23 VITALS
DIASTOLIC BLOOD PRESSURE: 68 MMHG | SYSTOLIC BLOOD PRESSURE: 126 MMHG | BODY MASS INDEX: 36.72 KG/M2 | HEART RATE: 89 BPM | OXYGEN SATURATION: 97 % | WEIGHT: 188 LBS

## 2018-05-23 DIAGNOSIS — E11.69 TYPE 2 DIABETES MELLITUS WITH OTHER SPECIFIED COMPLICATION, WITHOUT LONG-TERM CURRENT USE OF INSULIN (HCC): Primary | ICD-10-CM

## 2018-05-23 DIAGNOSIS — E83.52 HYPERCALCEMIA: ICD-10-CM

## 2018-05-23 DIAGNOSIS — K76.0 NONALCOHOLIC FATTY LIVER DISEASE: ICD-10-CM

## 2018-05-23 DIAGNOSIS — I10 BENIGN ESSENTIAL HYPERTENSION: ICD-10-CM

## 2018-05-23 DIAGNOSIS — E78.2 MIXED HYPERLIPIDEMIA: ICD-10-CM

## 2018-05-23 LAB
GLUCOSE BLDC GLUCOMTR-MCNC: 99 MG/DL (ref 70–130)
HBA1C MFR BLD: 5.7 %

## 2018-05-23 PROCEDURE — 83036 HEMOGLOBIN GLYCOSYLATED A1C: CPT | Performed by: INTERNAL MEDICINE

## 2018-05-23 PROCEDURE — 82947 ASSAY GLUCOSE BLOOD QUANT: CPT | Performed by: INTERNAL MEDICINE

## 2018-05-23 PROCEDURE — 99213 OFFICE O/P EST LOW 20 MIN: CPT | Performed by: INTERNAL MEDICINE

## 2018-05-23 NOTE — PROGRESS NOTES
Type 2 Diabetes Mellitus (follow up)    Subjective   Claire Lux is a 67 y.o. female is here today for follow-up.    History of Present Illness   Claire reports she is still on the Adipex for the fatty Liver.  To f/u with Dr. Mccall in July.  Reports she did not receive her letter last time.  Here for follow up on her DM2, HLP, HTN. Had some birthdays recently, had cake and dessert.    Current Outpatient Prescriptions:   •  ACCU-CHEK TINO PLUS test strip, USE TO TEST DAILY, Disp: 100 each, Rfl: 1  •  amlodipine-olmesartan (JENNI) 10-40 MG per tablet, TAKE 1 TABLET DAILY FOR BLOOD PRESSURE, Disp: 90 tablet, Rfl: 0  •  Cholecalciferol (VITAMIN D-3) 1000 UNITS capsule, Take 2 tablets by mouth daily., Disp: , Rfl:   •  glucose blood (ACCU-CHEK TINO PLUS) test strip, Accu-Chek Tino Plus In Vitro Strip; Patient Sig: Accu-Chek Tino Plus In Vitro Strip USE TO TEST DAILY; 1; 0; 09-Nov-2012; Active, Disp: , Rfl:   •  metFORMIN ER (GLUCOPHAGE-XR) 500 MG 24 hr tablet, TAKE 1 TABLET DAILY WITH BREAKFAST, Disp: 90 tablet, Rfl: 1  •  metoprolol succinate XL (TOPROL-XL) 100 MG 24 hr tablet, TAKE 1 TABLET DAILY, Disp: 90 tablet, Rfl: 0  •  Multiple Vitamins-Minerals (MULTI VITAMIN/MINERALS PO), Take 1 tablet by mouth daily., Disp: , Rfl:   •  niacin 500 MG tablet, Take 2 tablets by mouth 2 (two) times a day., Disp: , Rfl:   •  Omega-3 Fatty Acids (EQL FISH OIL) 1000 MG capsule, Take  by mouth daily., Disp: , Rfl:   •  phentermine (ADIPEX-P) 37.5 MG tablet, TAKE 1 TABLET DAILY, Disp: , Rfl: 2  •  pravastatin (PRAVACHOL) 40 MG tablet, TAKE 1 TABLET DAILY, Disp: 90 tablet, Rfl: 0  •  psyllium (METAMUCIL) 0.52 G capsule, Take 3 capsules by mouth 2 (two) times a day., Disp: , Rfl:   •  ursodiol (ACTIGALL) 300 MG capsule, Take 2 capsules by mouth 2 (two) times a day., Disp: , Rfl:   •  Vitamin E 400 UNITS tablet, Take 1 tablet by mouth daily., Disp: , Rfl:       The following portions of the patient's history were reviewed and  updated as appropriate: allergies, current medications, past family history, past medical history, past social history, past surgical history and problem list.    Review of Systems   Constitutional: Negative.  Negative for chills and fever.   HENT: Negative for ear discharge, ear pain, sinus pressure and sore throat.    Respiratory: Negative for cough, chest tightness and shortness of breath.    Cardiovascular: Negative for chest pain, palpitations and leg swelling.   Gastrointestinal: Negative for diarrhea, nausea and vomiting.   Musculoskeletal: Positive for gait problem. Negative for arthralgias, back pain and myalgias.   Neurological: Negative for dizziness, syncope and headaches.   Psychiatric/Behavioral: Negative for confusion and sleep disturbance.       Objective   /68   Pulse 89   Wt 85.3 kg (188 lb)   SpO2 97%   BMI 36.72 kg/m²   Physical Exam   Constitutional: She is oriented to person, place, and time. She appears well-developed and well-nourished.   HENT:   Head: Normocephalic and atraumatic.   Mouth/Throat: No oropharyngeal exudate.   Eyes: Conjunctivae are normal. Pupils are equal, round, and reactive to light.   Neck: Neck supple. No thyromegaly present.   Cardiovascular: Normal rate, regular rhythm and intact distal pulses.    Pulmonary/Chest: Effort normal and breath sounds normal.   Abdominal: Soft. Bowel sounds are normal. She exhibits no distension. There is no tenderness.   Musculoskeletal: She exhibits no edema.   Walking with a walker   Neurological: She is alert and oriented to person, place, and time. No cranial nerve deficit.   Skin: Skin is warm and dry.   Psychiatric: She has a normal mood and affect. Judgment normal.   Nursing note and vitals reviewed.        Results for orders placed or performed in visit on 05/23/18   POCT Glucose   Result Value Ref Range    Glucose 99 70 - 130 mg/dL   POC Glycosylated Hemoglobin (Hb A1C)   Result Value Ref Range    Hemoglobin A1C 5.7 %              Assessment/Plan   Diagnoses and all orders for this visit:    Type 2 diabetes mellitus with other specified complication, without long-term current use of insulin  -     POCT Glucose  -     POC Glycosylated Hemoglobin (Hb A1C)  -     Vitamin B12; Future  -     Hemoglobin A1c; Future    Benign essential hypertension  -     Comprehensive Metabolic Panel; Future    Mixed hyperlipidemia  -     Lipid Panel; Future  -     TSH; Future    Nonalcoholic fatty liver disease  -     CBC (No Diff); Future  -     Comprehensive Metabolic Panel; Future    Hypercalcemia  -     Vitamin D 25 Hydroxy; Future             Return for Next scheduled follow up.

## 2018-06-08 DIAGNOSIS — I10 BENIGN ESSENTIAL HYPERTENSION: ICD-10-CM

## 2018-06-08 RX ORDER — METOPROLOL SUCCINATE 100 MG/1
TABLET, EXTENDED RELEASE ORAL
Qty: 90 TABLET | Refills: 0 | Status: SHIPPED | OUTPATIENT
Start: 2018-06-08 | End: 2018-09-06 | Stop reason: SDUPTHER

## 2018-06-13 DIAGNOSIS — E11.9 DIABETES MELLITUS WITHOUT COMPLICATION (HCC): ICD-10-CM

## 2018-06-13 RX ORDER — METFORMIN HYDROCHLORIDE 500 MG/1
TABLET, EXTENDED RELEASE ORAL
Qty: 90 TABLET | Refills: 1 | Status: SHIPPED | OUTPATIENT
Start: 2018-06-13 | End: 2018-11-28 | Stop reason: SDUPTHER

## 2018-07-12 RX ORDER — PRAVASTATIN SODIUM 40 MG
TABLET ORAL
Qty: 90 TABLET | Refills: 0 | Status: SHIPPED | OUTPATIENT
Start: 2018-07-12 | End: 2018-10-10 | Stop reason: SDUPTHER

## 2018-07-26 DIAGNOSIS — I10 BENIGN ESSENTIAL HYPERTENSION: ICD-10-CM

## 2018-07-26 RX ORDER — AMLODIPINE AND OLMESARTAN MEDOXOMIL 10; 40 MG/1; MG/1
TABLET ORAL
Qty: 90 TABLET | Refills: 0 | Status: SHIPPED | OUTPATIENT
Start: 2018-07-26 | End: 2018-10-24 | Stop reason: SDUPTHER

## 2018-09-06 DIAGNOSIS — I10 BENIGN ESSENTIAL HYPERTENSION: ICD-10-CM

## 2018-09-06 RX ORDER — METOPROLOL SUCCINATE 100 MG/1
TABLET, EXTENDED RELEASE ORAL
Qty: 90 TABLET | Refills: 0 | Status: SHIPPED | OUTPATIENT
Start: 2018-09-06 | End: 2018-12-05 | Stop reason: SDUPTHER

## 2018-10-10 RX ORDER — PRAVASTATIN SODIUM 40 MG
TABLET ORAL
Qty: 90 TABLET | Refills: 1 | Status: SHIPPED | OUTPATIENT
Start: 2018-10-10 | End: 2019-04-08 | Stop reason: SDUPTHER

## 2018-10-19 ENCOUNTER — LAB (OUTPATIENT)
Dept: INTERNAL MEDICINE | Facility: CLINIC | Age: 68
End: 2018-10-19

## 2018-10-19 DIAGNOSIS — E78.2 MIXED HYPERLIPIDEMIA: ICD-10-CM

## 2018-10-19 DIAGNOSIS — E11.69 TYPE 2 DIABETES MELLITUS WITH OTHER SPECIFIED COMPLICATION, WITHOUT LONG-TERM CURRENT USE OF INSULIN (HCC): ICD-10-CM

## 2018-10-19 DIAGNOSIS — E83.52 HYPERCALCEMIA: ICD-10-CM

## 2018-10-19 DIAGNOSIS — I10 BENIGN ESSENTIAL HYPERTENSION: ICD-10-CM

## 2018-10-19 DIAGNOSIS — K76.0 NONALCOHOLIC FATTY LIVER DISEASE: ICD-10-CM

## 2018-10-19 LAB
25(OH)D3 SERPL-MCNC: 82.9 NG/ML
ALBUMIN SERPL-MCNC: 4.59 G/DL (ref 3.2–4.8)
ALBUMIN/GLOB SERPL: 2.7 G/DL (ref 1.5–2.5)
ALP SERPL-CCNC: 64 U/L (ref 25–100)
ALT SERPL W P-5'-P-CCNC: 26 U/L (ref 7–40)
ANION GAP SERPL CALCULATED.3IONS-SCNC: 5 MMOL/L (ref 3–11)
ARTICHOKE IGE QN: 64 MG/DL (ref 0–130)
AST SERPL-CCNC: 24 U/L (ref 0–33)
BILIRUB SERPL-MCNC: 0.5 MG/DL (ref 0.3–1.2)
BUN BLD-MCNC: 17 MG/DL (ref 9–23)
BUN/CREAT SERPL: 19.3 (ref 7–25)
CALCIUM SPEC-SCNC: 9.5 MG/DL (ref 8.7–10.4)
CHLORIDE SERPL-SCNC: 100 MMOL/L (ref 99–109)
CHOLEST SERPL-MCNC: 135 MG/DL (ref 0–200)
CO2 SERPL-SCNC: 32 MMOL/L (ref 20–31)
CREAT BLD-MCNC: 0.88 MG/DL (ref 0.6–1.3)
DEPRECATED RDW RBC AUTO: 44.3 FL (ref 37–54)
ERYTHROCYTE [DISTWIDTH] IN BLOOD BY AUTOMATED COUNT: 13.7 % (ref 11.3–14.5)
GFR SERPL CREATININE-BSD FRML MDRD: 64 ML/MIN/1.73
GLOBULIN UR ELPH-MCNC: 1.7 GM/DL
GLUCOSE BLD-MCNC: 97 MG/DL (ref 70–100)
HBA1C MFR BLD: 5.8 % (ref 4.8–5.6)
HCT VFR BLD AUTO: 43.8 % (ref 34.5–44)
HDLC SERPL-MCNC: 60 MG/DL (ref 40–60)
HGB BLD-MCNC: 14.5 G/DL (ref 11.5–15.5)
MCH RBC QN AUTO: 29.2 PG (ref 27–31)
MCHC RBC AUTO-ENTMCNC: 33.1 G/DL (ref 32–36)
MCV RBC AUTO: 88.3 FL (ref 80–99)
PLATELET # BLD AUTO: 256 10*3/MM3 (ref 150–450)
PMV BLD AUTO: 11.3 FL (ref 6–12)
POTASSIUM BLD-SCNC: 4.2 MMOL/L (ref 3.5–5.5)
PROT SERPL-MCNC: 6.3 G/DL (ref 5.7–8.2)
RBC # BLD AUTO: 4.96 10*6/MM3 (ref 3.89–5.14)
SODIUM BLD-SCNC: 137 MMOL/L (ref 132–146)
TRIGL SERPL-MCNC: 107 MG/DL (ref 0–150)
TSH SERPL DL<=0.05 MIU/L-ACNC: 3.01 MIU/ML (ref 0.35–5.35)
VIT B12 BLD-MCNC: 678 PG/ML (ref 211–911)
WBC NRBC COR # BLD: 6.52 10*3/MM3 (ref 3.5–10.8)

## 2018-10-19 PROCEDURE — 82607 VITAMIN B-12: CPT | Performed by: INTERNAL MEDICINE

## 2018-10-19 PROCEDURE — 80061 LIPID PANEL: CPT | Performed by: INTERNAL MEDICINE

## 2018-10-19 PROCEDURE — 80053 COMPREHEN METABOLIC PANEL: CPT | Performed by: INTERNAL MEDICINE

## 2018-10-19 PROCEDURE — 84443 ASSAY THYROID STIM HORMONE: CPT | Performed by: INTERNAL MEDICINE

## 2018-10-19 PROCEDURE — 82306 VITAMIN D 25 HYDROXY: CPT | Performed by: INTERNAL MEDICINE

## 2018-10-19 PROCEDURE — 85027 COMPLETE CBC AUTOMATED: CPT | Performed by: INTERNAL MEDICINE

## 2018-10-19 PROCEDURE — 83036 HEMOGLOBIN GLYCOSYLATED A1C: CPT | Performed by: INTERNAL MEDICINE

## 2018-10-24 ENCOUNTER — OFFICE VISIT (OUTPATIENT)
Dept: INTERNAL MEDICINE | Facility: CLINIC | Age: 68
End: 2018-10-24

## 2018-10-24 VITALS
SYSTOLIC BLOOD PRESSURE: 142 MMHG | DIASTOLIC BLOOD PRESSURE: 98 MMHG | HEART RATE: 73 BPM | OXYGEN SATURATION: 98 % | WEIGHT: 185.6 LBS | BODY MASS INDEX: 36.25 KG/M2

## 2018-10-24 DIAGNOSIS — I10 BENIGN ESSENTIAL HYPERTENSION: ICD-10-CM

## 2018-10-24 DIAGNOSIS — K76.0 NONALCOHOLIC FATTY LIVER DISEASE: ICD-10-CM

## 2018-10-24 DIAGNOSIS — E11.69 TYPE 2 DIABETES MELLITUS WITH OTHER SPECIFIED COMPLICATION, WITHOUT LONG-TERM CURRENT USE OF INSULIN (HCC): ICD-10-CM

## 2018-10-24 DIAGNOSIS — Z00.00 MEDICARE ANNUAL WELLNESS VISIT, SUBSEQUENT: Primary | ICD-10-CM

## 2018-10-24 LAB
POC CREATININE URINE: 50
POC MICROALBUMIN URINE: 10

## 2018-10-24 PROCEDURE — 82044 UR ALBUMIN SEMIQUANTITATIVE: CPT | Performed by: INTERNAL MEDICINE

## 2018-10-24 PROCEDURE — 82570 ASSAY OF URINE CREATININE: CPT | Performed by: INTERNAL MEDICINE

## 2018-10-24 PROCEDURE — 90662 IIV NO PRSV INCREASED AG IM: CPT | Performed by: INTERNAL MEDICINE

## 2018-10-24 PROCEDURE — G0008 ADMIN INFLUENZA VIRUS VAC: HCPCS | Performed by: INTERNAL MEDICINE

## 2018-10-24 PROCEDURE — G0439 PPPS, SUBSEQ VISIT: HCPCS | Performed by: INTERNAL MEDICINE

## 2018-10-24 RX ORDER — NALTREXONE HYDROCHLORIDE AND BUPROPION HYDROCHLORIDE 8; 90 MG/1; MG/1
2 TABLET, EXTENDED RELEASE ORAL 2 TIMES DAILY
Refills: 5 | COMMUNITY
Start: 2018-10-09 | End: 2020-07-08

## 2018-10-24 RX ORDER — AMLODIPINE AND OLMESARTAN MEDOXOMIL 10; 40 MG/1; MG/1
TABLET ORAL
Qty: 90 TABLET | Refills: 0 | Status: SHIPPED | OUTPATIENT
Start: 2018-10-24 | End: 2019-01-22 | Stop reason: SDUPTHER

## 2018-10-24 NOTE — PROGRESS NOTES
QUICK REFERENCE INFORMATION:  The ABCs of the Annual Wellness Visit    Subsequent Medicare Wellness Visit    HEALTH RISK ASSESSMENT    1950    Recent Hospitalizations:  No hospitalization(s) within the last year..        Current Medical Providers:  Patient Care Team:  Rosemarie Marley MD as PCP - General  Claire Mccall MD as PCP - Claims Attributed        Smoking Status:  History   Smoking Status   • Never Smoker   Smokeless Tobacco   • Never Used       Alcohol Consumption:  History   Alcohol Use No       Depression Screen:   PHQ-2/PHQ-9 Depression Screening 10/24/2018   Little interest or pleasure in doing things 0   Feeling down, depressed, or hopeless 0   Total Score 0       Health Habits and Functional and Cognitive Screening:  Functional & Cognitive Status 10/24/2018   Do you have difficulty preparing food and eating? No   Do you have difficulty bathing yourself, getting dressed or grooming yourself? No   Do you have difficulty using the toilet? No   Do you have difficulty moving around from place to place? No   Do you have trouble with steps or getting out of a bed or a chair? No   In the past year have you fallen or experienced a near fall? No   Current Diet Limited Junk Food   Dental Exam Not up to date   Eye Exam Up to date   Exercise (times per week) 0 times per week   Current Exercise Activities Include Walking   Do you need help using the phone?  No   Are you deaf or do you have serious difficulty hearing?  No   Do you need help with transportation? No   Do you need help shopping? No   Do you need help preparing meals?  No   Do you need help with housework?  No   Do you need help with laundry? No   Do you need help taking your medications? No   Do you need help managing money? No   Do you ever drive or ride in a car without wearing a seat belt? No   Have you felt unusual stress, anger or loneliness in the last month? No   Who do you live with? Sibling   If you need help, do you have trouble  finding someone available to you? No   Have you been bothered in the last four weeks by sexual problems? No           Does the patient have evidence of cognitive impairment? No    Aspirin use counseling: Taking ASA appropriately as indicated      Recent Lab Results:  CMP:  Lab Results   Component Value Date    BUN 17 10/19/2018    CREATININE 0.88 10/19/2018    EGFRIFNONA 64 10/19/2018    BCR 19.3 10/19/2018     10/19/2018    K 4.2 10/19/2018    CO2 32.0 (H) 10/19/2018    CALCIUM 9.5 10/19/2018    ALBUMIN 4.59 10/19/2018    BILITOT 0.5 10/19/2018    ALKPHOS 64 10/19/2018    AST 24 10/19/2018    ALT 26 10/19/2018     Lipid Panel:  Lab Results   Component Value Date    CHOL 135 10/19/2018    TRIG 107 10/19/2018    HDL 60 10/19/2018     HbA1c:  Lab Results   Component Value Date    HGBA1C 5.80 (H) 10/19/2018       Visual Acuity:  No exam data present    Age-appropriate Screening Schedule:  Refer to the list below for future screening recommendations based on patient's age, sex and/or medical conditions. Orders for these recommended tests are listed in the plan section. The patient has been provided with a written plan.    Health Maintenance   Topic Date Due   • ZOSTER VACCINE (2 of 2) 11/16/2017   • DIABETIC EYE EXAM  06/10/2018   • MAMMOGRAM  07/21/2018   • HEMOGLOBIN A1C  04/19/2019   • LIPID PANEL  10/19/2019   • DIABETIC FOOT EXAM  10/24/2019   • URINE MICROALBUMIN  10/24/2019   • COLONOSCOPY  08/10/2020   • INFLUENZA VACCINE  Completed   • PNEUMOCOCCAL VACCINES (65+ LOW/MEDIUM RISK)  Completed   • TDAP/TD VACCINES  Excluded        Subjective   History of Present Illness    Claire Lux is a 68 y.o. female who presents for an Subsequent Wellness Visit.    The following portions of the patient's history were reviewed and updated as appropriate: allergies, current medications, past family history, past medical history, past social history, past surgical history and problem list.    Outpatient Medications  Prior to Visit   Medication Sig Dispense Refill   • ACCU-CHEK TINO PLUS test strip USE TO TEST DAILY 100 each 1   • amlodipine-olmesartan (JENNI) 10-40 MG per tablet TAKE 1 TABLET DAILY FOR BLOOD PRESSURE 90 tablet 0   • Cholecalciferol (VITAMIN D-3) 1000 UNITS capsule Take 2 tablets by mouth daily.     • glucose blood (ACCU-CHEK TINO PLUS) test strip Accu-Chek Tino Plus In Vitro Strip; Patient Sig: Accu-Chek Tino Plus In Vitro Strip USE TO TEST DAILY; 1; 0; 09-Nov-2012; Active     • metFORMIN ER (GLUCOPHAGE-XR) 500 MG 24 hr tablet TAKE 1 TABLET DAILY WITH BREAKFAST 90 tablet 1   • metoprolol succinate XL (TOPROL-XL) 100 MG 24 hr tablet TAKE 1 TABLET DAILY 90 tablet 0   • Multiple Vitamins-Minerals (MULTI VITAMIN/MINERALS PO) Take 1 tablet by mouth daily.     • niacin 500 MG tablet Take 2 tablets by mouth 2 (two) times a day.     • Omega-3 Fatty Acids (EQL FISH OIL) 1000 MG capsule Take  by mouth daily.     • pravastatin (PRAVACHOL) 40 MG tablet TAKE 1 TABLET DAILY 90 tablet 1   • psyllium (METAMUCIL) 0.52 G capsule Take 3 capsules by mouth 2 (two) times a day.     • ursodiol (ACTIGALL) 300 MG capsule Take 2 capsules by mouth 2 (two) times a day.     • Vitamin E 400 UNITS tablet Take 1 tablet by mouth daily.     • phentermine (ADIPEX-P) 37.5 MG tablet TAKE 1 TABLET DAILY  2     No facility-administered medications prior to visit.        Patient Active Problem List   Diagnosis   • Abnormal weight gain   • Benign essential hypertension   • Hypercalcemia   • Hyperlipidemia   • Nonalcoholic fatty liver disease   • Type 2 diabetes mellitus (CMS/HCC)   • Postmenopausal disorder       Advance Care Planning:  has an advance directive - a copy HAS NOT been provided. Have asked the patient to send this to us to add to record.    Identification of Risk Factors:  Risk factors include: weight , cardiovascular risk and increased fall risk.    Review of Systems    Compared to one year ago, the patient feels her physical health  is better.  Compared to one year ago, the patient feels her mental health is better.    Objective     Physical Exam    Claire had a diabetic foot exam performed today.   During the foot exam she had a monofilament test performed (normal).      Vitals:    10/24/18 1047   BP: 142/98   Pulse: 73   SpO2: 98%  Comment: hali   Weight: 84.2 kg (185 lb 9.6 oz)       Patient's Body mass index is 36.25 kg/m². BMI is above normal parameters. Recommendations include: exercise counseling and nutrition counseling.      Assessment/Plan   Patient Self-Management and Personalized Health Advice  The patient has been provided with information about: diet, exercise, weight management, prevention of cardiac or vascular disease, fall prevention and supplements and preventive services including:   · Advance directive, Colorectal cancer screening, cologuard test ordered, Diabetes screening, see lab orders, Exercise counseling provided, Fall Risk assessment done, Nutrition counseling provided, Zostavax vaccine (Herpes Zoster).    Visit Diagnoses:    ICD-10-CM ICD-9-CM   1. Medicare annual wellness visit, subsequent Z00.00 V70.0   2. Benign essential hypertension I10 401.1   3. Nonalcoholic fatty liver disease K76.0 571.8   4. Type 2 diabetes mellitus with other specified complication, without long-term current use of insulin (CMS/[facility]) E11.69 250.80       Orders Placed This Encounter   Procedures   • Flu Vaccine High Dose PF 65YR+ ([id number])   • POCT microalbumin       Outpatient Encounter Prescriptions as of 10/24/2018   Medication Sig Dispense Refill   • ACCU-CHEK PRICILA PLUS test strip USE TO TEST DAILY 100 each 1   • amlodipine-olmesartan (JENNI) 10-40 MG per tablet TAKE 1 TABLET DAILY FOR BLOOD PRESSURE 90 tablet 0   • Cholecalciferol (VITAMIN D-3) 1000 UNITS capsule Take 2 tablets by mouth daily.     • glucose blood (ACCU-CHEK PRICILA PLUS) test strip Accu-Chek Pricila Plus In Vitro Strip; Patient Sig: Accu-Chek Pricila Plus In Vitro Strip  USE TO TEST DAILY; 1; 0; 09-Nov-2012; Active     • metFORMIN ER (GLUCOPHAGE-XR) 500 MG 24 hr tablet TAKE 1 TABLET DAILY WITH BREAKFAST 90 tablet 1   • metoprolol succinate XL (TOPROL-XL) 100 MG 24 hr tablet TAKE 1 TABLET DAILY 90 tablet 0   • Multiple Vitamins-Minerals (MULTI VITAMIN/MINERALS PO) Take 1 tablet by mouth daily.     • niacin 500 MG tablet Take 2 tablets by mouth 2 (two) times a day.     • Omega-3 Fatty Acids (EQL FISH OIL) 1000 MG capsule Take  by mouth daily.     • pravastatin (PRAVACHOL) 40 MG tablet TAKE 1 TABLET DAILY 90 tablet 1   • psyllium (METAMUCIL) 0.52 G capsule Take 3 capsules by mouth 2 (two) times a day.     • ursodiol (ACTIGALL) 300 MG capsule Take 2 capsules by mouth 2 (two) times a day.     • Vitamin E 400 UNITS tablet Take 1 tablet by mouth daily.     • CONTRAVE 8-90 MG tablet Take 2 tablets by mouth 2 (Two) Times a Day.  5   • [DISCONTINUED] amlodipine-olmesartan (JENNI) 10-40 MG per tablet TAKE 1 TABLET DAILY FOR BLOOD PRESSURE 90 tablet 0   • [DISCONTINUED] phentermine (ADIPEX-P) 37.5 MG tablet TAKE 1 TABLET DAILY  2     No facility-administered encounter medications on file as of 10/24/2018.        Reviewed use of high risk medication in the elderly: yes  Reviewed for potential of harmful drug interactions in the elderly: yes  Medicare wellness    Subjective   Claire Lux is a 68 y.o. female is here today for follow-up.    [facility]  Sharlene is here for a f/u on her DM2, htn, hlp and SMALLS. Reports got her Hep A vaccine several yrs ago by Dr. Mccall.  faheem had a recent eval by her which has come back well.    Current Outpatient Prescriptions:   •  ACCU-CHEK TINO PLUS test strip, USE TO TEST DAILY, Disp: 100 each, Rfl: 1  •  amlodipine-olmesartan (JENNI) 10-40 MG per tablet, TAKE 1 TABLET DAILY FOR BLOOD PRESSURE, Disp: 90 tablet, Rfl: 0  •  Cholecalciferol (VITAMIN D-3) 1000 UNITS capsule, Take 2 tablets by mouth daily., Disp: , Rfl:   •  glucose blood (ACCU-CHEK TINO PLUS) test  strip, Accu-Chek Pricila Plus In Vitro Strip; Patient Sig: Accu-Chek Pricila Plus In Vitro Strip USE TO TEST DAILY; 1; 0; 09-Nov-2012; Active, Disp: , Rfl:   •  metFORMIN ER (GLUCOPHAGE-XR) 500 MG 24 hr tablet, TAKE 1 TABLET DAILY WITH BREAKFAST, Disp: 90 tablet, Rfl: 1  •  metoprolol succinate XL (TOPROL-XL) 100 MG 24 hr tablet, TAKE 1 TABLET DAILY, Disp: 90 tablet, Rfl: 0  •  Multiple Vitamins-Minerals (MULTI VITAMIN/MINERALS PO), Take 1 tablet by mouth daily., Disp: , Rfl:   •  niacin 500 MG tablet, Take 2 tablets by mouth 2 (two) times a day., Disp: , Rfl:   •  Omega-3 Fatty Acids (EQL FISH OIL) 1000 MG capsule, Take  by mouth daily., Disp: , Rfl:   •  pravastatin (PRAVACHOL) 40 MG tablet, TAKE 1 TABLET DAILY, Disp: 90 tablet, Rfl: 1  •  psyllium (METAMUCIL) 0.52 G capsule, Take 3 capsules by mouth 2 (two) times a day., Disp: , Rfl:   •  ursodiol (ACTIGALL) 300 MG capsule, Take 2 capsules by mouth 2 (two) times a day., Disp: , Rfl:   •  Vitamin E 400 UNITS tablet, Take 1 tablet by mouth daily., Disp: , Rfl:   •  CONTRAVE 8-90 MG tablet, Take 2 tablets by mouth 2 (Two) Times a Day., Disp: , Rfl: 5      The following portions of the patient's history were reviewed and updated as appropriate: allergies, current medications, past family history, past medical history, past social history, past surgical history and problem list.        Objective   /98   Pulse 73   Wt 84.2 kg (185 lb 9.6 oz)   SpO2 98% Comment: ra  BMI 36.25 kg/m²         Results for orders placed or performed in visit on 10/24/18   POCT microalbumin   Result Value Ref Range    Microalbumin, Urine 10     Creatinine, Urine 50              Assessment/Plan   Diagnoses and all orders for this visit:    Medicare annual wellness visit, subsequent  -     POCT microalbumin    Benign essential hypertension  Comments:  cont meds    Nonalcoholic fatty liver disease  Comments:  stabke, followed by GI    Type 2 diabetes mellitus with other specified  complication, without long-term current use of insulin (CMS/HCC)    Other orders  -     Flu Vaccine High Dose PF 65YR+ ([id number])  -     CONTRAVE 8-90 MG tablet; Take 2 tablets by mouth 2 (Two) Times a Day.             Adv. To get mamm, eye exam and shingrix vaccinbe    Follow Up:  No Follow-up on file.     An After Visit Summary and PPPS with all of these plans were given to the patient.

## 2018-11-28 DIAGNOSIS — E11.9 DIABETES MELLITUS WITHOUT COMPLICATION (HCC): ICD-10-CM

## 2018-11-28 RX ORDER — METFORMIN HYDROCHLORIDE 500 MG/1
TABLET, EXTENDED RELEASE ORAL
Qty: 90 TABLET | Refills: 1 | Status: SHIPPED | OUTPATIENT
Start: 2018-11-28 | End: 2019-05-27 | Stop reason: SDUPTHER

## 2018-12-05 DIAGNOSIS — I10 BENIGN ESSENTIAL HYPERTENSION: ICD-10-CM

## 2018-12-05 RX ORDER — METOPROLOL SUCCINATE 100 MG/1
TABLET, EXTENDED RELEASE ORAL
Qty: 90 TABLET | Refills: 0 | Status: SHIPPED | OUTPATIENT
Start: 2018-12-05 | End: 2019-03-05 | Stop reason: SDUPTHER

## 2019-01-22 DIAGNOSIS — I10 BENIGN ESSENTIAL HYPERTENSION: ICD-10-CM

## 2019-01-22 RX ORDER — AMLODIPINE AND OLMESARTAN MEDOXOMIL 10; 40 MG/1; MG/1
TABLET ORAL
Qty: 90 TABLET | Refills: 0 | Status: SHIPPED | OUTPATIENT
Start: 2019-01-22 | End: 2019-04-22 | Stop reason: SDUPTHER

## 2019-01-24 ENCOUNTER — OFFICE VISIT (OUTPATIENT)
Dept: INTERNAL MEDICINE | Facility: CLINIC | Age: 69
End: 2019-01-24

## 2019-01-24 VITALS
OXYGEN SATURATION: 94 % | HEART RATE: 73 BPM | WEIGHT: 187 LBS | BODY MASS INDEX: 36.52 KG/M2 | SYSTOLIC BLOOD PRESSURE: 128 MMHG | DIASTOLIC BLOOD PRESSURE: 80 MMHG

## 2019-01-24 DIAGNOSIS — E11.69 TYPE 2 DIABETES MELLITUS WITH OTHER SPECIFIED COMPLICATION, WITHOUT LONG-TERM CURRENT USE OF INSULIN (HCC): Primary | ICD-10-CM

## 2019-01-24 LAB
GLUCOSE BLDC GLUCOMTR-MCNC: 150 MG/DL (ref 70–130)
HBA1C MFR BLD: 5.6 %

## 2019-01-24 PROCEDURE — 82947 ASSAY GLUCOSE BLOOD QUANT: CPT | Performed by: INTERNAL MEDICINE

## 2019-01-24 PROCEDURE — 99213 OFFICE O/P EST LOW 20 MIN: CPT | Performed by: INTERNAL MEDICINE

## 2019-01-24 PROCEDURE — 83036 HEMOGLOBIN GLYCOSYLATED A1C: CPT | Performed by: INTERNAL MEDICINE

## 2019-01-24 NOTE — PROGRESS NOTES
Follow-up and Diabetes    Subjective   Claire Lux is a 68 y.o. female is here today for follow-up.    History of Present Illness   Sharlene is here for a follow up on her DM2 and HTN.   BP is doing well. She has been compliant with her diet andmedication  Has an appointment with Dr. Mccall tomorrow .    Current Outpatient Medications:   •  ACCU-CHEK PRICILA PLUS test strip, USE TO TEST DAILY, Disp: 100 each, Rfl: 1  •  amlodipine-olmesartan (JENNI) 10-40 MG per tablet, TAKE 1 TABLET DAILY FOR BLOOD PRESSURE, Disp: 90 tablet, Rfl: 0  •  Cholecalciferol (VITAMIN D-3) 1000 UNITS capsule, Take 2 tablets by mouth daily., Disp: , Rfl:   •  CONTRAVE 8-90 MG tablet, Take 2 tablets by mouth 2 (Two) Times a Day., Disp: , Rfl: 5  •  glucose blood (ACCU-CHEK PRICILA PLUS) test strip, Accu-Chek Pricila Plus In Vitro Strip; Patient Sig: Accu-Chek Pricila Plus In Vitro Strip USE TO TEST DAILY; 1; 0; 09-Nov-2012; Active, Disp: , Rfl:   •  metFORMIN ER (GLUCOPHAGE-XR) 500 MG 24 hr tablet, TAKE 1 TABLET DAILY WITH BREAKFAST, Disp: 90 tablet, Rfl: 1  •  metoprolol succinate XL (TOPROL-XL) 100 MG 24 hr tablet, TAKE 1 TABLET DAILY, Disp: 90 tablet, Rfl: 0  •  Multiple Vitamins-Minerals (MULTI VITAMIN/MINERALS PO), Take 1 tablet by mouth daily., Disp: , Rfl:   •  niacin 500 MG tablet, Take 2 tablets by mouth 2 (two) times a day., Disp: , Rfl:   •  Omega-3 Fatty Acids (EQL FISH OIL) 1000 MG capsule, Take  by mouth daily., Disp: , Rfl:   •  pravastatin (PRAVACHOL) 40 MG tablet, TAKE 1 TABLET DAILY, Disp: 90 tablet, Rfl: 1  •  psyllium (METAMUCIL) 0.52 G capsule, Take 3 capsules by mouth 2 (two) times a day., Disp: , Rfl:   •  ursodiol (ACTIGALL) 300 MG capsule, Take 2 capsules by mouth 2 (two) times a day., Disp: , Rfl:   •  Vitamin E 400 UNITS tablet, Take 1 tablet by mouth daily., Disp: , Rfl:       The following portions of the patient's history were reviewed and updated as appropriate: allergies, current medications, past family history,  past medical history, past social history, past surgical history and problem list.    Review of Systems   Constitutional: Negative.  Negative for chills and fever.   HENT: Negative for ear discharge, ear pain, sinus pressure and sore throat.    Respiratory: Negative for cough, chest tightness and shortness of breath.    Cardiovascular: Negative for chest pain, palpitations and leg swelling.   Gastrointestinal: Negative for diarrhea, nausea and vomiting.   Musculoskeletal: Negative for arthralgias, back pain and myalgias.   Neurological: Negative for dizziness, syncope and headaches.   Psychiatric/Behavioral: Negative for confusion and sleep disturbance.       Objective   /80   Pulse 73   Wt 84.8 kg (187 lb)   SpO2 94% Comment: ra  BMI 36.52 kg/m²   Physical Exam   Constitutional: She is oriented to person, place, and time. She appears well-developed and well-nourished.   HENT:   Head: Normocephalic and atraumatic.   Right Ear: External ear normal.   Left Ear: External ear normal.   Mouth/Throat: No oropharyngeal exudate.   Neck: Neck supple. No thyromegaly present.   Cardiovascular: Normal rate and regular rhythm.   Pulmonary/Chest: Effort normal and breath sounds normal.   Abdominal: Soft. Bowel sounds are normal. She exhibits no distension. There is no tenderness.   Musculoskeletal: She exhibits no edema.   Neurological: She is alert and oriented to person, place, and time. No cranial nerve deficit.   Skin: Skin is warm and dry.   Psychiatric: She has a normal mood and affect. Judgment normal.   Nursing note and vitals reviewed.        Results for orders placed or performed in visit on 01/24/19   POCT Glucose   Result Value Ref Range    Glucose 150 (A) 70 - 130 mg/dL   POC Glycosylated Hemoglobin (Hb A1C)   Result Value Ref Range    Hemoglobin A1C 5.6 %             Assessment/Plan   Diagnoses and all orders for this visit:    Type 2 diabetes mellitus with other specified complication, without long-term  current use of insulin (CMS/[facility])  -     POCT Glucose  -     POC Glycosylated Hemoglobin (Hb A1C)    A1C- 5.6 , much better from 5.8.  Commended on the improvement.       Shingrix is not covered.  She will call to set up the Mamm.    Return in about 4 months (around 5/24/2019) for Next scheduled follow up.

## 2019-03-05 DIAGNOSIS — I10 BENIGN ESSENTIAL HYPERTENSION: ICD-10-CM

## 2019-03-05 RX ORDER — METOPROLOL SUCCINATE 100 MG/1
TABLET, EXTENDED RELEASE ORAL
Qty: 90 TABLET | Refills: 1 | Status: SHIPPED | OUTPATIENT
Start: 2019-03-05 | End: 2019-09-09 | Stop reason: SDUPTHER

## 2019-04-08 RX ORDER — PRAVASTATIN SODIUM 40 MG
TABLET ORAL
Qty: 90 TABLET | Refills: 1 | Status: SHIPPED | OUTPATIENT
Start: 2019-04-08 | End: 2019-09-09 | Stop reason: SDUPTHER

## 2019-04-22 DIAGNOSIS — I10 BENIGN ESSENTIAL HYPERTENSION: ICD-10-CM

## 2019-04-22 RX ORDER — AMLODIPINE AND OLMESARTAN MEDOXOMIL 10; 40 MG/1; MG/1
TABLET ORAL
Qty: 90 TABLET | Refills: 0 | Status: SHIPPED | OUTPATIENT
Start: 2019-04-22 | End: 2019-06-07 | Stop reason: SDUPTHER

## 2019-05-27 DIAGNOSIS — E11.9 DIABETES MELLITUS WITHOUT COMPLICATION (HCC): ICD-10-CM

## 2019-05-27 RX ORDER — METFORMIN HYDROCHLORIDE 500 MG/1
TABLET, EXTENDED RELEASE ORAL
Qty: 90 TABLET | Refills: 1 | Status: SHIPPED | OUTPATIENT
Start: 2019-05-27 | End: 2019-09-09 | Stop reason: SDUPTHER

## 2019-06-07 ENCOUNTER — OFFICE VISIT (OUTPATIENT)
Dept: INTERNAL MEDICINE | Facility: CLINIC | Age: 69
End: 2019-06-07

## 2019-06-07 VITALS
DIASTOLIC BLOOD PRESSURE: 78 MMHG | BODY MASS INDEX: 36.52 KG/M2 | OXYGEN SATURATION: 96 % | HEIGHT: 60 IN | HEART RATE: 70 BPM | WEIGHT: 186 LBS | SYSTOLIC BLOOD PRESSURE: 128 MMHG

## 2019-06-07 DIAGNOSIS — E11.69 TYPE 2 DIABETES MELLITUS WITH OTHER SPECIFIED COMPLICATION, WITHOUT LONG-TERM CURRENT USE OF INSULIN (HCC): Primary | ICD-10-CM

## 2019-06-07 DIAGNOSIS — K76.0 NONALCOHOLIC FATTY LIVER DISEASE: ICD-10-CM

## 2019-06-07 DIAGNOSIS — I10 BENIGN ESSENTIAL HYPERTENSION: ICD-10-CM

## 2019-06-07 DIAGNOSIS — E78.2 MIXED HYPERLIPIDEMIA: ICD-10-CM

## 2019-06-07 LAB
ALBUMIN SERPL-MCNC: 4.9 G/DL (ref 3.5–5.2)
ALBUMIN/GLOB SERPL: 1.7 G/DL
ALP SERPL-CCNC: 67 U/L (ref 39–117)
ALT SERPL W P-5'-P-CCNC: 18 U/L (ref 1–33)
ANION GAP SERPL CALCULATED.3IONS-SCNC: 14.6 MMOL/L
AST SERPL-CCNC: 20 U/L (ref 1–32)
BILIRUB SERPL-MCNC: 0.6 MG/DL (ref 0.2–1.2)
BUN BLD-MCNC: 13 MG/DL (ref 8–23)
BUN/CREAT SERPL: 13.8 (ref 7–25)
CALCIUM SPEC-SCNC: 11.1 MG/DL (ref 8.6–10.5)
CHLORIDE SERPL-SCNC: 98 MMOL/L (ref 98–107)
CHOLEST SERPL-MCNC: 146 MG/DL (ref 0–200)
CO2 SERPL-SCNC: 26.4 MMOL/L (ref 22–29)
CREAT BLD-MCNC: 0.94 MG/DL (ref 0.57–1)
GFR SERPL CREATININE-BSD FRML MDRD: 59 ML/MIN/1.73
GLOBULIN UR ELPH-MCNC: 2.9 GM/DL
GLUCOSE BLD-MCNC: 90 MG/DL (ref 65–99)
GLUCOSE BLDC GLUCOMTR-MCNC: 103 MG/DL (ref 70–130)
HBA1C MFR BLD: 5.5 %
HDLC SERPL-MCNC: 69 MG/DL (ref 40–60)
LDLC SERPL CALC-MCNC: 47 MG/DL (ref 0–100)
LDLC/HDLC SERPL: 0.68 {RATIO}
POTASSIUM BLD-SCNC: 4.2 MMOL/L (ref 3.5–5.2)
PROT SERPL-MCNC: 7.8 G/DL (ref 6–8.5)
SODIUM BLD-SCNC: 139 MMOL/L (ref 136–145)
TRIGL SERPL-MCNC: 149 MG/DL (ref 0–150)
VLDLC SERPL-MCNC: 29.8 MG/DL (ref 5–40)

## 2019-06-07 PROCEDURE — 80061 LIPID PANEL: CPT | Performed by: INTERNAL MEDICINE

## 2019-06-07 PROCEDURE — 82962 GLUCOSE BLOOD TEST: CPT | Performed by: INTERNAL MEDICINE

## 2019-06-07 PROCEDURE — 99214 OFFICE O/P EST MOD 30 MIN: CPT | Performed by: INTERNAL MEDICINE

## 2019-06-07 PROCEDURE — 80053 COMPREHEN METABOLIC PANEL: CPT | Performed by: INTERNAL MEDICINE

## 2019-06-07 PROCEDURE — 83036 HEMOGLOBIN GLYCOSYLATED A1C: CPT | Performed by: INTERNAL MEDICINE

## 2019-06-07 RX ORDER — AMLODIPINE AND OLMESARTAN MEDOXOMIL 10; 40 MG/1; MG/1
1 TABLET ORAL DAILY
Qty: 90 TABLET | Refills: 1 | Status: SHIPPED | OUTPATIENT
Start: 2019-06-07 | End: 2019-09-09 | Stop reason: SDUPTHER

## 2019-06-07 NOTE — PROGRESS NOTES
Diabetes    Subjective   Claire Lux is a 68 y.o. female is here today for follow-up.    History of Present Illness   Here for f/u on htn, dm2, hlp and nNASH.  Followed by GI- Dr. Mccall .   Denies side effects on current regimen.    Current Outpatient Medications:   •  ACCU-CHEK TINO PLUS test strip, USE TO TEST DAILY, Disp: 100 each, Rfl: 1  •  amlodipine-olmesartan (JENNI) 10-40 MG per tablet, Take 1 tablet by mouth Daily. for blood pressure, Disp: 90 tablet, Rfl: 1  •  Cholecalciferol (VITAMIN D-3) 1000 UNITS capsule, Take 2 tablets by mouth daily., Disp: , Rfl:   •  CONTRAVE 8-90 MG tablet, Take 2 tablets by mouth 2 (Two) Times a Day., Disp: , Rfl: 5  •  glucose blood (ACCU-CHEK ITNO PLUS) test strip, Accu-Chek Tino Plus In Vitro Strip; Patient Sig: Accu-Chek Tino Plus In Vitro Strip USE TO TEST DAILY; 1; 0; 09-Nov-2012; Active, Disp: , Rfl:   •  metFORMIN ER (GLUCOPHAGE-XR) 500 MG 24 hr tablet, TAKE 1 TABLET DAILY WITH BREAKFAST, Disp: 90 tablet, Rfl: 1  •  metoprolol succinate XL (TOPROL-XL) 100 MG 24 hr tablet, TAKE 1 TABLET DAILY, Disp: 90 tablet, Rfl: 1  •  Multiple Vitamins-Minerals (MULTI VITAMIN/MINERALS PO), Take 1 tablet by mouth daily., Disp: , Rfl:   •  niacin 500 MG tablet, Take 2 tablets by mouth 2 (two) times a day., Disp: , Rfl:   •  Omega-3 Fatty Acids (EQL FISH OIL) 1000 MG capsule, Take  by mouth daily., Disp: , Rfl:   •  pravastatin (PRAVACHOL) 40 MG tablet, TAKE 1 TABLET DAILY, Disp: 90 tablet, Rfl: 1  •  psyllium (METAMUCIL) 0.52 G capsule, Take 3 capsules by mouth 2 (two) times a day., Disp: , Rfl:   •  ursodiol (ACTIGALL) 300 MG capsule, Take 2 capsules by mouth 2 (two) times a day., Disp: , Rfl:   •  Vitamin E 400 UNITS tablet, Take 1 tablet by mouth daily., Disp: , Rfl:       The following portions of the patient's history were reviewed and updated as appropriate: allergies, current medications, past family history, past medical history, past social history, past surgical  "history and problem list.    Review of Systems   Constitutional: Negative.  Negative for chills and fever.   HENT: Negative for ear discharge, ear pain, sinus pressure and sore throat.    Respiratory: Negative for cough, chest tightness and shortness of breath.    Cardiovascular: Negative for chest pain, palpitations and leg swelling.   Gastrointestinal: Negative for diarrhea, nausea and vomiting.   Musculoskeletal: Negative for arthralgias, back pain and myalgias.   Neurological: Negative for dizziness, syncope and headaches.   Psychiatric/Behavioral: Negative for confusion and sleep disturbance.       Objective   /78   Pulse 70   Ht 152.4 cm (60\")   Wt 84.4 kg (186 lb)   SpO2 96% Comment: ra  BMI 36.33 kg/m²   Physical Exam   Constitutional: She is oriented to person, place, and time. She appears well-developed and well-nourished.   HENT:   Head: Normocephalic and atraumatic.   Mouth/Throat: No oropharyngeal exudate.   Eyes: Conjunctivae are normal. Pupils are equal, round, and reactive to light.   Neck: Neck supple. No thyromegaly present.   Cardiovascular: Normal rate and regular rhythm.   Pulmonary/Chest: Effort normal and breath sounds normal.   Abdominal: Soft. Bowel sounds are normal. She exhibits no distension. There is no tenderness.   Musculoskeletal: She exhibits no edema.   Neurological: She is alert and oriented to person, place, and time. No cranial nerve deficit.   Skin: Skin is warm and dry.   Psychiatric: She has a normal mood and affect. Judgment normal.   Nursing note and vitals reviewed.        Results for orders placed or performed in visit on 06/07/19   Comprehensive Metabolic Panel   Result Value Ref Range    Glucose 90 65 - 99 mg/dL    BUN 13 8 - 23 mg/dL    Creatinine 0.94 0.57 - 1.00 mg/dL    Sodium 139 136 - 145 mmol/L    Potassium 4.2 3.5 - 5.2 mmol/L    Chloride 98 98 - 107 mmol/L    CO2 26.4 22.0 - 29.0 mmol/L    Calcium 11.1 (H) 8.6 - 10.5 mg/dL    Total Protein 7.8 6.0 - " 8.5 g/dL    Albumin 4.90 3.50 - 5.20 g/dL    ALT (SGPT) 18 1 - 33 U/L    AST (SGOT) 20 1 - 32 U/L    Alkaline Phosphatase 67 39 - 117 U/L    Total Bilirubin 0.6 0.2 - 1.2 mg/dL    eGFR Non African Amer 59 (L) >60 mL/min/1.73    Globulin 2.9 gm/dL    A/G Ratio 1.7 g/dL    BUN/Creatinine Ratio 13.8 7.0 - 25.0    Anion Gap 14.6 mmol/L   Lipid Panel   Result Value Ref Range    Total Cholesterol 146 0 - 200 mg/dL    Triglycerides 149 0 - 150 mg/dL    HDL Cholesterol 69 (H) 40 - 60 mg/dL    LDL Cholesterol  47 0 - 100 mg/dL    VLDL Cholesterol 29.8 5 - 40 mg/dL    LDL/HDL Ratio 0.68    POCT Glucose   Result Value Ref Range    Glucose 103 70 - 130 mg/dL   POC Glycosylated Hemoglobin (Hb A1C)   Result Value Ref Range    Hemoglobin A1C 5.5 %             Assessment/Plan   Diagnoses and all orders for this visit:    Type 2 diabetes mellitus with other specified complication, without long-term current use of insulin (CMS/[facility])  -     POCT Glucose  -     POC Glycosylated Hemoglobin (Hb A1C)    Benign essential hypertension  -     amlodipine-olmesartan (JENNI) 10-40 MG per tablet; Take 1 tablet by mouth Daily. for blood pressure    Mixed hyperlipidemia  -     Comprehensive Metabolic Panel  -     Lipid Panel    Nonalcoholic fatty liver disease  -     Comprehensive Metabolic Panel                 Return in about 6 months (around 12/7/2019) for Annual.

## 2019-09-09 ENCOUNTER — OFFICE VISIT (OUTPATIENT)
Dept: INTERNAL MEDICINE | Facility: CLINIC | Age: 69
End: 2019-09-09

## 2019-09-09 VITALS
DIASTOLIC BLOOD PRESSURE: 90 MMHG | BODY MASS INDEX: 37.22 KG/M2 | WEIGHT: 189.6 LBS | OXYGEN SATURATION: 97 % | HEART RATE: 75 BPM | HEIGHT: 60 IN | SYSTOLIC BLOOD PRESSURE: 138 MMHG

## 2019-09-09 DIAGNOSIS — E78.2 MIXED HYPERLIPIDEMIA: ICD-10-CM

## 2019-09-09 DIAGNOSIS — I10 BENIGN ESSENTIAL HYPERTENSION: ICD-10-CM

## 2019-09-09 DIAGNOSIS — E83.52 HYPERCALCEMIA: Primary | ICD-10-CM

## 2019-09-09 DIAGNOSIS — E11.69 TYPE 2 DIABETES MELLITUS WITH OTHER SPECIFIED COMPLICATION, WITHOUT LONG-TERM CURRENT USE OF INSULIN (HCC): ICD-10-CM

## 2019-09-09 LAB
ANION GAP SERPL CALCULATED.3IONS-SCNC: 13.4 MMOL/L (ref 5–15)
BUN BLD-MCNC: 17 MG/DL (ref 8–23)
BUN/CREAT SERPL: 20.7 (ref 7–25)
CA-I BLD-MCNC: 5.8 MG/DL (ref 4.6–5.4)
CA-I SERPL ISE-MCNC: 1.46 MMOL/L (ref 1.15–1.35)
CALCIUM SPEC-SCNC: 10.9 MG/DL (ref 8.6–10.5)
CHLORIDE SERPL-SCNC: 99 MMOL/L (ref 98–107)
CO2 SERPL-SCNC: 27.6 MMOL/L (ref 22–29)
CREAT BLD-MCNC: 0.82 MG/DL (ref 0.57–1)
GFR SERPL CREATININE-BSD FRML MDRD: 69 ML/MIN/1.73
GLUCOSE BLD-MCNC: 76 MG/DL (ref 65–99)
GLUCOSE BLDC GLUCOMTR-MCNC: 79 MG/DL (ref 70–130)
HBA1C MFR BLD: 5.4 %
POTASSIUM BLD-SCNC: 4.7 MMOL/L (ref 3.5–5.2)
PTH-INTACT SERPL-MCNC: 17.7 PG/ML (ref 15–65)
SODIUM BLD-SCNC: 140 MMOL/L (ref 136–145)
TSH SERPL DL<=0.05 MIU/L-ACNC: 3.14 UIU/ML (ref 0.27–4.2)

## 2019-09-09 PROCEDURE — 83970 ASSAY OF PARATHORMONE: CPT | Performed by: INTERNAL MEDICINE

## 2019-09-09 PROCEDURE — 82330 ASSAY OF CALCIUM: CPT | Performed by: INTERNAL MEDICINE

## 2019-09-09 PROCEDURE — 82306 VITAMIN D 25 HYDROXY: CPT | Performed by: INTERNAL MEDICINE

## 2019-09-09 PROCEDURE — 84443 ASSAY THYROID STIM HORMONE: CPT | Performed by: INTERNAL MEDICINE

## 2019-09-09 PROCEDURE — 80048 BASIC METABOLIC PNL TOTAL CA: CPT | Performed by: INTERNAL MEDICINE

## 2019-09-09 PROCEDURE — 83036 HEMOGLOBIN GLYCOSYLATED A1C: CPT | Performed by: INTERNAL MEDICINE

## 2019-09-09 PROCEDURE — 82947 ASSAY GLUCOSE BLOOD QUANT: CPT | Performed by: INTERNAL MEDICINE

## 2019-09-09 PROCEDURE — 99214 OFFICE O/P EST MOD 30 MIN: CPT | Performed by: INTERNAL MEDICINE

## 2019-09-09 RX ORDER — AMLODIPINE AND OLMESARTAN MEDOXOMIL 10; 40 MG/1; MG/1
1 TABLET ORAL DAILY
Qty: 90 TABLET | Refills: 1 | Status: SHIPPED | OUTPATIENT
Start: 2019-09-09 | End: 2020-03-04 | Stop reason: SDUPTHER

## 2019-09-09 RX ORDER — METOPROLOL SUCCINATE 100 MG/1
100 TABLET, EXTENDED RELEASE ORAL DAILY
Qty: 90 TABLET | Refills: 1 | Status: SHIPPED | OUTPATIENT
Start: 2019-09-09 | End: 2020-03-04 | Stop reason: SDUPTHER

## 2019-09-09 RX ORDER — METFORMIN HYDROCHLORIDE 500 MG/1
500 TABLET, EXTENDED RELEASE ORAL
Qty: 90 TABLET | Refills: 1 | Status: SHIPPED | OUTPATIENT
Start: 2019-09-09 | End: 2020-03-04 | Stop reason: SDUPTHER

## 2019-09-09 RX ORDER — PRAVASTATIN SODIUM 40 MG
40 TABLET ORAL DAILY
Qty: 90 TABLET | Refills: 1 | Status: SHIPPED | OUTPATIENT
Start: 2019-09-09 | End: 2020-03-04 | Stop reason: SDUPTHER

## 2019-09-09 NOTE — PROGRESS NOTES
Diabetes    Subjective   Claire Lux is a 68 y.o. female is here today for follow-up.    History of Present Illness   Here for a follow up on her HTN. DM2 and hypercalcemia. Doing better.  Sugars stable at home.      Current Outpatient Medications:   •  ACCU-CHEK TINO PLUS test strip, USE TO TEST DAILY, Disp: 100 each, Rfl: 1  •  amlodipine-olmesartan (JENNI) 10-40 MG per tablet, Take 1 tablet by mouth Daily. for blood pressure, Disp: 90 tablet, Rfl: 1  •  Cholecalciferol (VITAMIN D-3) 1000 UNITS capsule, Take 2 tablets by mouth daily., Disp: , Rfl:   •  CONTRAVE 8-90 MG tablet, Take 2 tablets by mouth 2 (Two) Times a Day., Disp: , Rfl: 5  •  glucose blood (ACCU-CHEK TINO PLUS) test strip, Accu-Chek Tino Plus In Vitro Strip; Patient Sig: Accu-Chek Tino Plus In Vitro Strip USE TO TEST DAILY; 1; 0; 09-Nov-2012; Active, Disp: , Rfl:   •  metFORMIN ER (GLUCOPHAGE-XR) 500 MG 24 hr tablet, Take 1 tablet by mouth Daily With Breakfast., Disp: 90 tablet, Rfl: 1  •  metoprolol succinate XL (TOPROL-XL) 100 MG 24 hr tablet, Take 1 tablet by mouth Daily., Disp: 90 tablet, Rfl: 1  •  Multiple Vitamins-Minerals (MULTI VITAMIN/MINERALS PO), Take 1 tablet by mouth daily., Disp: , Rfl:   •  niacin 500 MG tablet, Take 2 tablets by mouth 2 (two) times a day., Disp: , Rfl:   •  Omega-3 Fatty Acids (EQL FISH OIL) 1000 MG capsule, Take  by mouth daily., Disp: , Rfl:   •  pravastatin (PRAVACHOL) 40 MG tablet, Take 1 tablet by mouth Daily., Disp: 90 tablet, Rfl: 1  •  psyllium (METAMUCIL) 0.52 G capsule, Take 3 capsules by mouth 2 (two) times a day., Disp: , Rfl:   •  ursodiol (ACTIGALL) 300 MG capsule, Take 2 capsules by mouth 2 (two) times a day., Disp: , Rfl:   •  Vitamin E 400 UNITS tablet, Take 1 tablet by mouth daily., Disp: , Rfl:       The following portions of the patient's history were reviewed and updated as appropriate: allergies, current medications, past family history, past medical history, past social history, past  "surgical history and problem list.    Review of Systems   Constitutional: Negative.  Negative for chills and fever.   HENT: Negative for ear discharge, ear pain, sinus pressure and sore throat.    Respiratory: Negative for cough, chest tightness and shortness of breath.    Cardiovascular: Negative for chest pain, palpitations and leg swelling.   Gastrointestinal: Negative for diarrhea, nausea and vomiting.   Musculoskeletal: Negative for arthralgias, back pain and myalgias.   Neurological: Negative for dizziness, syncope and headaches.   Psychiatric/Behavioral: Negative for confusion and sleep disturbance.       Objective   /90   Pulse 75   Ht 152.4 cm (60\")   Wt 86 kg (189 lb 9.6 oz)   SpO2 97% Comment: ra  BMI 37.03 kg/m²   Physical Exam   Constitutional: She is oriented to person, place, and time. She appears well-developed and well-nourished.   HENT:   Head: Normocephalic and atraumatic.   Mouth/Throat: No oropharyngeal exudate.   Eyes: Conjunctivae are normal. Pupils are equal, round, and reactive to light.   Neck: Neck supple. No thyromegaly present.   Cardiovascular: Normal rate and regular rhythm.   Pulmonary/Chest: Effort normal and breath sounds normal.   Abdominal: Soft. Bowel sounds are normal. She exhibits no distension. There is no tenderness.   Musculoskeletal: She exhibits no edema.   Neurological: She is alert and oriented to person, place, and time. No cranial nerve deficit.   Skin: Skin is warm and dry.   Psychiatric: She has a normal mood and affect. Judgment normal.   Nursing note and vitals reviewed.        Results for orders placed or performed in visit on 09/09/19   Basic Metabolic Panel   Result Value Ref Range    Glucose 76 65 - 99 mg/dL    BUN 17 8 - 23 mg/dL    Creatinine 0.82 0.57 - 1.00 mg/dL    Sodium 140 136 - 145 mmol/L    Potassium 4.7 3.5 - 5.2 mmol/L    Chloride 99 98 - 107 mmol/L    CO2 27.6 22.0 - 29.0 mmol/L    Calcium 10.9 (H) 8.6 - 10.5 mg/dL    eGFR Non African " Amer 69 >60 mL/min/1.73    BUN/Creatinine Ratio 20.7 7.0 - 25.0    Anion Gap 13.4 5.0 - 15.0 mmol/L   Calcium, Ionized   Result Value Ref Range    Ionized Calcium 1.46 (H) 1.15 - 1.35 mmol/L    Ionized Calcium 5.8 (H) 4.6 - 5.4 mg/dL   PTH, Intact   Result Value Ref Range    PTH, Intact 17.7 15.0 - 65.0 pg/mL   TSH   Result Value Ref Range    TSH 3.140 0.270 - 4.200 uIU/mL   Vitamin D 25 Hydroxy   Result Value Ref Range    25 Hydroxy, Vitamin D >120.0 (H) 30.0 - 100.0 ng/ml   POC Glycosylated Hemoglobin (Hb A1C)   Result Value Ref Range    Hemoglobin A1C 5.4 %   POCT Glucose   Result Value Ref Range    Glucose 79 70 - 130 mg/dL             Assessment/Plan   Diagnoses and all orders for this visit:    Hypercalcemia  -     Basic Metabolic Panel  -     Calcium, Ionized  -     PTH, Intact  -     TSH  -     Vitamin D 25 Hydroxy    Type 2 diabetes mellitus with other specified complication, without long-term current use of insulin (CMS/[facility])  -     POC Glycosylated Hemoglobin (Hb A1C)  -     POCT Glucose  -     metFORMIN ER (GLUCOPHAGE-XR) 500 MG 24 hr tablet; Take 1 tablet by mouth Daily With Breakfast.    Benign essential hypertension  -     amlodipine-olmesartan (JENNI) 10-40 MG per tablet; Take 1 tablet by mouth Daily. for blood pressure  -     metoprolol succinate XL (TOPROL-XL) 100 MG 24 hr tablet; Take 1 tablet by mouth Daily.    Mixed hyperlipidemia  -     pravastatin (PRAVACHOL) 40 MG tablet; Take 1 tablet by mouth Daily.  -     TSH                 Return in about 4 months (around 1/9/2020) for Medicare Wellness.

## 2019-09-10 LAB — 25(OH)D3 SERPL-MCNC: >120 NG/ML (ref 30–100)

## 2019-12-04 ENCOUNTER — OFFICE VISIT (OUTPATIENT)
Dept: INTERNAL MEDICINE | Facility: CLINIC | Age: 69
End: 2019-12-04

## 2019-12-04 VITALS
SYSTOLIC BLOOD PRESSURE: 148 MMHG | DIASTOLIC BLOOD PRESSURE: 88 MMHG | OXYGEN SATURATION: 98 % | WEIGHT: 196.2 LBS | HEART RATE: 76 BPM | BODY MASS INDEX: 38.52 KG/M2 | HEIGHT: 60 IN

## 2019-12-04 DIAGNOSIS — T45.2X4D POISONING BY VITAMIN D, UNDETERMINED INTENT, SUBSEQUENT ENCOUNTER: ICD-10-CM

## 2019-12-04 DIAGNOSIS — E55.9 AVITAMINOSIS D: ICD-10-CM

## 2019-12-04 DIAGNOSIS — E11.69 TYPE 2 DIABETES MELLITUS WITH OTHER SPECIFIED COMPLICATION, WITHOUT LONG-TERM CURRENT USE OF INSULIN (HCC): Primary | ICD-10-CM

## 2019-12-04 DIAGNOSIS — E83.52 HYPERCALCEMIA: ICD-10-CM

## 2019-12-04 LAB
25(OH)D3 SERPL-MCNC: 74.7 NG/ML (ref 30–100)
GLUCOSE BLDC GLUCOMTR-MCNC: 172 MG/DL (ref 70–130)
HBA1C MFR BLD: 5.7 %
PTH-INTACT SERPL-MCNC: 22.1 PG/ML (ref 15–65)

## 2019-12-04 PROCEDURE — 90653 IIV ADJUVANT VACCINE IM: CPT | Performed by: INTERNAL MEDICINE

## 2019-12-04 PROCEDURE — 82947 ASSAY GLUCOSE BLOOD QUANT: CPT | Performed by: INTERNAL MEDICINE

## 2019-12-04 PROCEDURE — 99213 OFFICE O/P EST LOW 20 MIN: CPT | Performed by: INTERNAL MEDICINE

## 2019-12-04 PROCEDURE — 80053 COMPREHEN METABOLIC PANEL: CPT | Performed by: INTERNAL MEDICINE

## 2019-12-04 PROCEDURE — 83036 HEMOGLOBIN GLYCOSYLATED A1C: CPT | Performed by: INTERNAL MEDICINE

## 2019-12-04 PROCEDURE — 83970 ASSAY OF PARATHORMONE: CPT | Performed by: INTERNAL MEDICINE

## 2019-12-04 PROCEDURE — G0008 ADMIN INFLUENZA VIRUS VAC: HCPCS | Performed by: INTERNAL MEDICINE

## 2019-12-04 PROCEDURE — 82306 VITAMIN D 25 HYDROXY: CPT | Performed by: INTERNAL MEDICINE

## 2019-12-04 PROCEDURE — 82330 ASSAY OF CALCIUM: CPT | Performed by: INTERNAL MEDICINE

## 2019-12-05 LAB
ALBUMIN SERPL-MCNC: 4.8 G/DL (ref 3.5–5.2)
ALBUMIN/GLOB SERPL: 1.7 G/DL
ALP SERPL-CCNC: 66 U/L (ref 39–117)
ALT SERPL W P-5'-P-CCNC: 22 U/L (ref 1–33)
ANION GAP SERPL CALCULATED.3IONS-SCNC: 15.9 MMOL/L (ref 5–15)
AST SERPL-CCNC: 23 U/L (ref 1–32)
BILIRUB SERPL-MCNC: 0.5 MG/DL (ref 0.2–1.2)
BUN BLD-MCNC: 17 MG/DL (ref 8–23)
BUN/CREAT SERPL: 19.3 (ref 7–25)
CA-I BLD-MCNC: 5.2 MG/DL (ref 4.6–5.4)
CA-I SERPL ISE-MCNC: 1.3 MMOL/L (ref 1.15–1.35)
CALCIUM SPEC-SCNC: 10.1 MG/DL (ref 8.6–10.5)
CHLORIDE SERPL-SCNC: 100 MMOL/L (ref 98–107)
CO2 SERPL-SCNC: 26.1 MMOL/L (ref 22–29)
CREAT BLD-MCNC: 0.88 MG/DL (ref 0.57–1)
GFR SERPL CREATININE-BSD FRML MDRD: 63 ML/MIN/1.73
GLOBULIN UR ELPH-MCNC: 2.8 GM/DL
GLUCOSE BLD-MCNC: 120 MG/DL (ref 65–99)
POTASSIUM BLD-SCNC: 4.6 MMOL/L (ref 3.5–5.2)
PROT SERPL-MCNC: 7.6 G/DL (ref 6–8.5)
SODIUM BLD-SCNC: 142 MMOL/L (ref 136–145)

## 2020-03-04 ENCOUNTER — OFFICE VISIT (OUTPATIENT)
Dept: INTERNAL MEDICINE | Facility: CLINIC | Age: 70
End: 2020-03-04

## 2020-03-04 ENCOUNTER — APPOINTMENT (OUTPATIENT)
Dept: LAB | Facility: HOSPITAL | Age: 70
End: 2020-03-04

## 2020-03-04 VITALS
WEIGHT: 201.4 LBS | OXYGEN SATURATION: 98 % | BODY MASS INDEX: 39.54 KG/M2 | DIASTOLIC BLOOD PRESSURE: 90 MMHG | HEART RATE: 94 BPM | SYSTOLIC BLOOD PRESSURE: 124 MMHG | HEIGHT: 60 IN

## 2020-03-04 DIAGNOSIS — E78.2 MIXED HYPERLIPIDEMIA: ICD-10-CM

## 2020-03-04 DIAGNOSIS — I10 BENIGN ESSENTIAL HYPERTENSION: ICD-10-CM

## 2020-03-04 DIAGNOSIS — T45.2X4D POISONING BY VITAMIN D, UNDETERMINED INTENT, SUBSEQUENT ENCOUNTER: ICD-10-CM

## 2020-03-04 DIAGNOSIS — Z00.00 MEDICARE ANNUAL WELLNESS VISIT, SUBSEQUENT: Primary | ICD-10-CM

## 2020-03-04 DIAGNOSIS — E83.52 HYPERCALCEMIA: ICD-10-CM

## 2020-03-04 DIAGNOSIS — K76.0 NONALCOHOLIC FATTY LIVER DISEASE: ICD-10-CM

## 2020-03-04 DIAGNOSIS — E11.69 TYPE 2 DIABETES MELLITUS WITH OTHER SPECIFIED COMPLICATION, WITHOUT LONG-TERM CURRENT USE OF INSULIN (HCC): ICD-10-CM

## 2020-03-04 LAB
25(OH)D3 SERPL-MCNC: 60.7 NG/ML (ref 30–100)
ALBUMIN SERPL-MCNC: 4.7 G/DL (ref 3.5–5.2)
ALBUMIN/GLOB SERPL: 1.8 G/DL
ALP SERPL-CCNC: 82 U/L (ref 39–117)
ALT SERPL W P-5'-P-CCNC: 32 U/L (ref 1–33)
ANION GAP SERPL CALCULATED.3IONS-SCNC: 17.3 MMOL/L (ref 5–15)
AST SERPL-CCNC: 31 U/L (ref 1–32)
BILIRUB SERPL-MCNC: 0.5 MG/DL (ref 0.2–1.2)
BUN BLD-MCNC: 13 MG/DL (ref 8–23)
BUN/CREAT SERPL: 17.8 (ref 7–25)
CALCIUM SPEC-SCNC: 10.4 MG/DL (ref 8.6–10.5)
CHLORIDE SERPL-SCNC: 99 MMOL/L (ref 98–107)
CHOLEST SERPL-MCNC: 161 MG/DL (ref 0–200)
CO2 SERPL-SCNC: 24.7 MMOL/L (ref 22–29)
CREAT BLD-MCNC: 0.73 MG/DL (ref 0.57–1)
DEPRECATED RDW RBC AUTO: 44 FL (ref 37–54)
ERYTHROCYTE [DISTWIDTH] IN BLOOD BY AUTOMATED COUNT: 13.8 % (ref 12.3–15.4)
GFR SERPL CREATININE-BSD FRML MDRD: 79 ML/MIN/1.73
GLOBULIN UR ELPH-MCNC: 2.6 GM/DL
GLUCOSE BLD-MCNC: 115 MG/DL (ref 65–99)
HBA1C MFR BLD: 6 %
HCT VFR BLD AUTO: 46.6 % (ref 34–46.6)
HDLC SERPL-MCNC: 61 MG/DL (ref 40–60)
HGB BLD-MCNC: 15.8 G/DL (ref 12–15.9)
LDLC SERPL CALC-MCNC: 54 MG/DL (ref 0–100)
LDLC/HDLC SERPL: 0.88 {RATIO}
MCH RBC QN AUTO: 30.3 PG (ref 26.6–33)
MCHC RBC AUTO-ENTMCNC: 33.9 G/DL (ref 31.5–35.7)
MCV RBC AUTO: 89.3 FL (ref 79–97)
PLATELET # BLD AUTO: 263 10*3/MM3 (ref 140–450)
PMV BLD AUTO: 11 FL (ref 6–12)
POC CREATININE URINE: 10
POC MICROALBUMIN URINE: 10
POTASSIUM BLD-SCNC: 4.3 MMOL/L (ref 3.5–5.2)
PROT SERPL-MCNC: 7.3 G/DL (ref 6–8.5)
RBC # BLD AUTO: 5.22 10*6/MM3 (ref 3.77–5.28)
SODIUM BLD-SCNC: 141 MMOL/L (ref 136–145)
TRIGL SERPL-MCNC: 232 MG/DL (ref 0–150)
TSH SERPL DL<=0.05 MIU/L-ACNC: 2.87 UIU/ML (ref 0.27–4.2)
VIT B12 BLD-MCNC: 785 PG/ML (ref 211–946)
VLDLC SERPL-MCNC: 46.4 MG/DL (ref 5–40)
WBC NRBC COR # BLD: 6.32 10*3/MM3 (ref 3.4–10.8)

## 2020-03-04 PROCEDURE — 82607 VITAMIN B-12: CPT | Performed by: INTERNAL MEDICINE

## 2020-03-04 PROCEDURE — G0439 PPPS, SUBSEQ VISIT: HCPCS | Performed by: INTERNAL MEDICINE

## 2020-03-04 PROCEDURE — 85027 COMPLETE CBC AUTOMATED: CPT | Performed by: INTERNAL MEDICINE

## 2020-03-04 PROCEDURE — 84443 ASSAY THYROID STIM HORMONE: CPT | Performed by: INTERNAL MEDICINE

## 2020-03-04 PROCEDURE — 83036 HEMOGLOBIN GLYCOSYLATED A1C: CPT | Performed by: INTERNAL MEDICINE

## 2020-03-04 PROCEDURE — G0444 DEPRESSION SCREEN ANNUAL: HCPCS | Performed by: INTERNAL MEDICINE

## 2020-03-04 PROCEDURE — 82306 VITAMIN D 25 HYDROXY: CPT | Performed by: INTERNAL MEDICINE

## 2020-03-04 PROCEDURE — 80053 COMPREHEN METABOLIC PANEL: CPT | Performed by: INTERNAL MEDICINE

## 2020-03-04 PROCEDURE — 82044 UR ALBUMIN SEMIQUANTITATIVE: CPT | Performed by: INTERNAL MEDICINE

## 2020-03-04 PROCEDURE — 80061 LIPID PANEL: CPT | Performed by: INTERNAL MEDICINE

## 2020-03-04 RX ORDER — METFORMIN HYDROCHLORIDE 500 MG/1
500 TABLET, EXTENDED RELEASE ORAL
Qty: 90 TABLET | Refills: 1 | Status: SHIPPED | OUTPATIENT
Start: 2020-03-04 | End: 2020-07-02 | Stop reason: SDUPTHER

## 2020-03-04 RX ORDER — AMLODIPINE AND OLMESARTAN MEDOXOMIL 10; 40 MG/1; MG/1
1 TABLET ORAL DAILY
Qty: 90 TABLET | Refills: 1 | Status: SHIPPED | OUTPATIENT
Start: 2020-03-04 | End: 2020-08-31

## 2020-03-04 RX ORDER — PRAVASTATIN SODIUM 40 MG
40 TABLET ORAL DAILY
Qty: 90 TABLET | Refills: 1 | Status: SHIPPED | OUTPATIENT
Start: 2020-03-04 | End: 2020-08-31

## 2020-03-04 RX ORDER — METOPROLOL SUCCINATE 100 MG/1
100 TABLET, EXTENDED RELEASE ORAL DAILY
Qty: 90 TABLET | Refills: 1 | Status: SHIPPED | OUTPATIENT
Start: 2020-03-04 | End: 2020-08-31

## 2020-03-04 NOTE — PROGRESS NOTES
The ABCs of the Annual Wellness Visit  Subsequent Medicare Wellness Visit    Chief Complaint   Patient presents with   • Medicare Wellness-subsequent       Subjective   History of Present Illness:  Claire Lux is a 69 y.o. female who presents for a Subsequent Medicare Wellness Visit.    HEALTH RISK ASSESSMENT    Recent Hospitalizations:  No hospitalization(s) within the last year.    Current Medical Providers:  Patient Care Team:  Rosemarie Marley MD as PCP - General  Claire Mccall MD as PCP - Claims Attributed    Smoking Status:  Social History     Tobacco Use   Smoking Status Never Smoker   Smokeless Tobacco Never Used       Alcohol Consumption:  Social History     Substance and Sexual Activity   Alcohol Use No       Depression Screen:   PHQ-2/PHQ-9 Depression Screening 3/4/2020   Little interest or pleasure in doing things 0   Feeling down, depressed, or hopeless 0   Total Score 0       Fall Risk Screen:  ANNAADI Fall Risk Assessment was completed, and patient is at LOW risk for falls.Assessment completed on:3/4/2020    Health Habits and Functional and Cognitive Screening:  Functional & Cognitive Status 3/4/2020   Do you have difficulty preparing food and eating? No   Do you have difficulty bathing yourself, getting dressed or grooming yourself? No   Do you have difficulty using the toilet? No   Do you have difficulty moving around from place to place? No   Do you have trouble with steps or getting out of a bed or a chair? No   Current Diet Limited Junk Food   Dental Exam Not up to date   Eye Exam Up to date   Exercise (times per week) -   Current Exercise Activities Include No Regular Exercise   Do you need help using the phone?  No   Are you deaf or do you have serious difficulty hearing?  No   Do you need help with transportation? No   Do you need help shopping? No   Do you need help preparing meals?  No   Do you need help with housework?  No   Do you need help with laundry? No   Do you need help  taking your medications? No   Do you need help managing money? No   Do you ever drive or ride in a car without wearing a seat belt? No   Have you felt unusual stress, anger or loneliness in the last month? No   Who do you live with? Sibling   If you need help, do you have trouble finding someone available to you? No   Have you been bothered in the last four weeks by sexual problems? No   Do you have difficulty concentrating, remembering or making decisions? No         Does the patient have evidence of cognitive impairment? No    Asprin use counseling:Taking ASA appropriately as indicated    Age-appropriate Screening Schedule:  Refer to the list below for future screening recommendations based on patient's age, sex and/or medical conditions. Orders for these recommended tests are listed in the plan section. The patient has been provided with a written plan.    Health Maintenance   Topic Date Due   • MAMMOGRAM  07/21/2018   • DIABETIC FOOT EXAM  10/24/2019   • DIABETIC EYE EXAM  01/14/2020   • DXA SCAN  03/04/2020   • ZOSTER VACCINE (2 of 2) 09/17/2020 (Originally 11/16/2017)   • COLONOSCOPY  08/10/2020   • HEMOGLOBIN A1C  09/04/2020   • LIPID PANEL  03/04/2021   • URINE MICROALBUMIN  03/04/2021   • INFLUENZA VACCINE  Completed   • TDAP/TD VACCINES  Discontinued          The following portions of the patient's history were reviewed and updated as appropriate: allergies, current medications, past family history, past medical history, past social history, past surgical history and problem list.    Outpatient Medications Prior to Visit   Medication Sig Dispense Refill   • ACCU-CHEK TINO PLUS test strip USE TO TEST DAILY 100 each 1   • CONTRAVE 8-90 MG tablet Take 2 tablets by mouth 2 (Two) Times a Day.  5   • glucose blood (ACCU-CHEK TINO PLUS) test strip Accu-Chek Tino Plus In Vitro Strip; Patient Sig: Accu-Chek Tino Plus In Vitro Strip USE TO TEST DAILY; 1; 0; 09-Nov-2012; Active     • Multiple Vitamins-Minerals  (MULTI VITAMIN/MINERALS PO) Take 1 tablet by mouth daily.     • niacin 500 MG tablet Take 2 tablets by mouth 2 (two) times a day.     • Omega-3 Fatty Acids (EQL FISH OIL) 1000 MG capsule Take  by mouth daily.     • psyllium (METAMUCIL) 0.52 G capsule Take 3 capsules by mouth 2 (two) times a day.     • ursodiol (ACTIGALL) 300 MG capsule Take 2 capsules by mouth 2 (two) times a day.     • Vitamin E 400 UNITS tablet Take 1 tablet by mouth daily.     • amlodipine-olmesartan (JENNI) 10-40 MG per tablet Take 1 tablet by mouth Daily. for blood pressure 90 tablet 1   • metFORMIN ER (GLUCOPHAGE-XR) 500 MG 24 hr tablet Take 1 tablet by mouth Daily With Breakfast. 90 tablet 1   • metoprolol succinate XL (TOPROL-XL) 100 MG 24 hr tablet Take 1 tablet by mouth Daily. 90 tablet 1   • pravastatin (PRAVACHOL) 40 MG tablet Take 1 tablet by mouth Daily. 90 tablet 1     No facility-administered medications prior to visit.        Patient Active Problem List   Diagnosis   • Abnormal weight gain   • Benign essential hypertension   • Hypercalcemia   • Hyperlipidemia   • Nonalcoholic fatty liver disease   • Type 2 diabetes mellitus (CMS/HCC)   • Postmenopausal disorder       Advanced Care Planning:  ACP discussion was held with the patient during this visit. Patient has an advance directive (not in EMR), copy requested.    Review of Systems   Constitutional: Negative.  Negative for chills, fatigue and fever.   HENT: Negative for congestion, ear discharge, ear pain, sinus pressure and sore throat.    Eyes: Negative for pain, redness and visual disturbance.   Respiratory: Negative for cough, chest tightness and shortness of breath.    Cardiovascular: Negative for chest pain, palpitations and leg swelling.   Gastrointestinal: Negative for abdominal pain, diarrhea, nausea and vomiting.   Endocrine: Negative for cold intolerance and heat intolerance.   Genitourinary: Negative for flank pain and urgency.   Musculoskeletal: Negative for  "arthralgias, back pain, gait problem and myalgias.   Skin: Negative for pallor and rash.   Neurological: Negative for dizziness, syncope, weakness and headaches.   Psychiatric/Behavioral: Negative for confusion, dysphoric mood and sleep disturbance. The patient is not nervous/anxious.        Compared to one year ago, the patient feels her physical health is better.  Compared to one year ago, the patient feels her mental health is better.    Reviewed chart for potential of high risk medication in the elderly: yes  Reviewed chart for potential of harmful drug interactions in the elderly:yes    Objective         Vitals:    03/04/20 0946   BP: 124/90   Pulse: 94   SpO2: 98%  Comment: ra   Weight: 91.4 kg (201 lb 6.4 oz)   Height: 152.4 cm (60\")   PainSc: 0-No pain       Body mass index is 39.33 kg/m².  Discussed the patient's BMI with her. The BMI is above average; BMI management plan is completed.    Physical Exam    Lab Results   Component Value Date    TRIG 232 (H) 03/04/2020    HDL 61 (H) 03/04/2020    LDL 54 03/04/2020    VLDL 46.4 (H) 03/04/2020    HGBA1C 6.0 03/04/2020        Assessment/Plan   Medicare Risks and Personalized Health Plan  CMS Preventative Services Quick Reference  Advance Directive Discussion  Breast Cancer/Mammogram Screening  Cardiovascular risk  Colon Cancer Screening  Depression/Dysphoria  Fall Risk  Immunizations Discussed/Encouraged (specific immunizations; Shingrix )  Obesity/Overweight   Osteoprorosis Risk  Urinary Incontinence   shingrix not covered, adv- tdap    The above risks/problems have been discussed with the patient.  Pertinent information has been shared with the patient in the After Visit Summary.  Follow up plans and orders are seen below in the Assessment/Plan Section.    Medicare Wellness-subsequent    Subjective   Claire Lux is a 69 y.o. female is here today for follow-up.  HPI  Doing well, denies any problems, here for a follow up on her Dm2, HTN, HLP.  BP higher, " "thinks meds are not completely taking effects    Current Outpatient Medications:   •  ACCU-CHEK TINO PLUS test strip, USE TO TEST DAILY, Disp: 100 each, Rfl: 1  •  amlodipine-olmesartan (JENNI) 10-40 MG per tablet, Take 1 tablet by mouth Daily. for blood pressure, Disp: 90 tablet, Rfl: 1  •  CONTRAVE 8-90 MG tablet, Take 2 tablets by mouth 2 (Two) Times a Day., Disp: , Rfl: 5  •  glucose blood (ACCU-CHEK TINO PLUS) test strip, Accu-Chek Tino Plus In Vitro Strip; Patient Sig: Accu-Chek Tino Plus In Vitro Strip USE TO TEST DAILY; 1; 0; 09-Nov-2012; Active, Disp: , Rfl:   •  metFORMIN ER (GLUCOPHAGE-XR) 500 MG 24 hr tablet, Take 1 tablet by mouth Daily With Breakfast., Disp: 90 tablet, Rfl: 1  •  metoprolol succinate XL (TOPROL-XL) 100 MG 24 hr tablet, Take 1 tablet by mouth Daily., Disp: 90 tablet, Rfl: 1  •  Multiple Vitamins-Minerals (MULTI VITAMIN/MINERALS PO), Take 1 tablet by mouth daily., Disp: , Rfl:   •  niacin 500 MG tablet, Take 2 tablets by mouth 2 (two) times a day., Disp: , Rfl:   •  Omega-3 Fatty Acids (EQL FISH OIL) 1000 MG capsule, Take  by mouth daily., Disp: , Rfl:   •  pravastatin (PRAVACHOL) 40 MG tablet, Take 1 tablet by mouth Daily., Disp: 90 tablet, Rfl: 1  •  psyllium (METAMUCIL) 0.52 G capsule, Take 3 capsules by mouth 2 (two) times a day., Disp: , Rfl:   •  ursodiol (ACTIGALL) 300 MG capsule, Take 2 capsules by mouth 2 (two) times a day., Disp: , Rfl:   •  Vitamin E 400 UNITS tablet, Take 1 tablet by mouth daily., Disp: , Rfl:       The following portions of the patient's history were reviewed and updated as appropriate: allergies, current medications, past family history, past medical history, past social history, past surgical history and problem list.        Objective   /90   Pulse 94   Ht 152.4 cm (60\")   Wt 91.4 kg (201 lb 6.4 oz)   SpO2 98% Comment: ra  BMI 39.33 kg/m²         Results for orders placed or performed in visit on 03/04/20   CBC (No Diff)   Result Value Ref " Range    WBC 6.32 3.40 - 10.80 10*3/mm3    RBC 5.22 3.77 - 5.28 10*6/mm3    Hemoglobin 15.8 12.0 - 15.9 g/dL    Hematocrit 46.6 34.0 - 46.6 %    MCV 89.3 79.0 - 97.0 fL    MCH 30.3 26.6 - 33.0 pg    MCHC 33.9 31.5 - 35.7 g/dL    RDW 13.8 12.3 - 15.4 %    RDW-SD 44.0 37.0 - 54.0 fl    MPV 11.0 6.0 - 12.0 fL    Platelets 263 140 - 450 10*3/mm3   Comprehensive Metabolic Panel   Result Value Ref Range    Glucose 115 (H) 65 - 99 mg/dL    BUN 13 8 - 23 mg/dL    Creatinine 0.73 0.57 - 1.00 mg/dL    Sodium 141 136 - 145 mmol/L    Potassium 4.3 3.5 - 5.2 mmol/L    Chloride 99 98 - 107 mmol/L    CO2 24.7 22.0 - 29.0 mmol/L    Calcium 10.4 8.6 - 10.5 mg/dL    Total Protein 7.3 6.0 - 8.5 g/dL    Albumin 4.70 3.50 - 5.20 g/dL    ALT (SGPT) 32 1 - 33 U/L    AST (SGOT) 31 1 - 32 U/L    Alkaline Phosphatase 82 39 - 117 U/L    Total Bilirubin 0.5 0.2 - 1.2 mg/dL    eGFR Non African Amer 79 >60 mL/min/1.73    Globulin 2.6 gm/dL    A/G Ratio 1.8 g/dL    BUN/Creatinine Ratio 17.8 7.0 - 25.0    Anion Gap 17.3 (H) 5.0 - 15.0 mmol/L   Lipid Panel   Result Value Ref Range    Total Cholesterol 161 0 - 200 mg/dL    Triglycerides 232 (H) 0 - 150 mg/dL    HDL Cholesterol 61 (H) 40 - 60 mg/dL    LDL Cholesterol  54 0 - 100 mg/dL    VLDL Cholesterol 46.4 (H) 5 - 40 mg/dL    LDL/HDL Ratio 0.88    TSH   Result Value Ref Range    TSH 2.870 0.270 - 4.200 uIU/mL   Vitamin D 25 Hydroxy   Result Value Ref Range    25 Hydroxy, Vitamin D 60.7 30.0 - 100.0 ng/ml   Vitamin B12   Result Value Ref Range    Vitamin B-12 785 211 - 946 pg/mL   POC Glycosylated Hemoglobin (Hb A1C)   Result Value Ref Range    Hemoglobin A1C 6.0 %   POCT microalbumin   Result Value Ref Range    Microalbumin, Urine 10     Creatinine, Urine 10              Assessment/Plan   Diagnoses and all orders for this visit:    Medicare annual wellness visit, subsequent    Benign essential hypertension  -     amlodipine-olmesartan (JENNI) 10-40 MG per tablet; Take 1 tablet by mouth Daily. for  blood pressure  -     metoprolol succinate XL (TOPROL-XL) 100 MG 24 hr tablet; Take 1 tablet by mouth Daily.    Type 2 diabetes mellitus with other specified complication, without long-term current use of insulin (CMS/HCC)  -     metFORMIN ER (GLUCOPHAGE-XR) 500 MG 24 hr tablet; Take 1 tablet by mouth Daily With Breakfast.  -     POC Glycosylated Hemoglobin (Hb A1C)  -     POCT microalbumin  -     Vitamin B12    Mixed hyperlipidemia  -     pravastatin (PRAVACHOL) 40 MG tablet; Take 1 tablet by mouth Daily.  -     Comprehensive Metabolic Panel  -     Lipid Panel  -     TSH    Nonalcoholic fatty liver disease  -     CBC (No Diff)  -     Comprehensive Metabolic Panel    Hypercalcemia  -     Vitamin D 25 Hydroxy    Poisoning by vitamin D, undetermined intent, subsequent encounter  -     Vitamin D 25 Hydroxy               PHQ-2/PHQ-9 Depression screening reviewed with patient . Total time spent today for depression screening  with Claire Lux  was 15 minutes in counseling, along with plans for any diagnositc work-up and treatment.Answers for HPI/ROS submitted by the patient on 3/2/2020   What is the primary reason for your visit?: Physical      Follow Up:  Return in about 4 months (around 7/4/2020) for Next scheduled follow up, Labs today.     An After Visit Summary and PPPS were given to the patient.

## 2020-07-02 DIAGNOSIS — E11.69 TYPE 2 DIABETES MELLITUS WITH OTHER SPECIFIED COMPLICATION, WITHOUT LONG-TERM CURRENT USE OF INSULIN (HCC): ICD-10-CM

## 2020-07-03 RX ORDER — METFORMIN HYDROCHLORIDE 500 MG/1
500 TABLET, EXTENDED RELEASE ORAL
Qty: 90 TABLET | Refills: 0 | Status: SHIPPED | OUTPATIENT
Start: 2020-07-03 | End: 2020-11-11 | Stop reason: SDUPTHER

## 2020-07-06 ENCOUNTER — TRANSCRIBE ORDERS (OUTPATIENT)
Dept: ADMINISTRATIVE | Facility: HOSPITAL | Age: 70
End: 2020-07-06

## 2020-07-06 DIAGNOSIS — Z12.31 VISIT FOR SCREENING MAMMOGRAM: Primary | ICD-10-CM

## 2020-07-08 ENCOUNTER — OFFICE VISIT (OUTPATIENT)
Dept: INTERNAL MEDICINE | Facility: CLINIC | Age: 70
End: 2020-07-08

## 2020-07-08 ENCOUNTER — LAB (OUTPATIENT)
Dept: LAB | Facility: HOSPITAL | Age: 70
End: 2020-07-08

## 2020-07-08 VITALS
DIASTOLIC BLOOD PRESSURE: 68 MMHG | HEART RATE: 76 BPM | WEIGHT: 199.4 LBS | SYSTOLIC BLOOD PRESSURE: 132 MMHG | HEIGHT: 60 IN | BODY MASS INDEX: 39.15 KG/M2 | TEMPERATURE: 97.7 F | OXYGEN SATURATION: 95 %

## 2020-07-08 DIAGNOSIS — E78.2 MIXED HYPERLIPIDEMIA: ICD-10-CM

## 2020-07-08 DIAGNOSIS — I10 BENIGN ESSENTIAL HYPERTENSION: ICD-10-CM

## 2020-07-08 DIAGNOSIS — K76.0 NONALCOHOLIC FATTY LIVER DISEASE: ICD-10-CM

## 2020-07-08 DIAGNOSIS — E11.69 TYPE 2 DIABETES MELLITUS WITH OTHER SPECIFIED COMPLICATION, WITHOUT LONG-TERM CURRENT USE OF INSULIN (HCC): Primary | ICD-10-CM

## 2020-07-08 LAB — HBA1C MFR BLD: 5.8 %

## 2020-07-08 PROCEDURE — 80076 HEPATIC FUNCTION PANEL: CPT | Performed by: INTERNAL MEDICINE

## 2020-07-08 PROCEDURE — 80061 LIPID PANEL: CPT | Performed by: INTERNAL MEDICINE

## 2020-07-08 PROCEDURE — 83036 HEMOGLOBIN GLYCOSYLATED A1C: CPT | Performed by: INTERNAL MEDICINE

## 2020-07-08 PROCEDURE — 99214 OFFICE O/P EST MOD 30 MIN: CPT | Performed by: INTERNAL MEDICINE

## 2020-07-08 RX ORDER — MULTIVIT-MIN/IRON/FOLIC ACID/K 18-600-40
1 CAPSULE ORAL DAILY
COMMUNITY
Start: 2020-03-04

## 2020-07-08 NOTE — PROGRESS NOTES
Diabetes; Hypertension; and Hyperlipidemia    Subjective   Claire Lux is a 69 y.o. female is here today for follow-up.    History of Present Illness   Claire is here for a follow up on her dm2, htn, and hlp.  Doing well, denies any medication side effects.  Answers for HPI/ROS submitted by the patient on 7/1/2020   Diabetes problem  What is the primary reason for your visit?: Diabetes          Current Outpatient Medications:   •  amlodipine-olmesartan (JENNI) 10-40 MG per tablet, Take 1 tablet by mouth Daily. for blood pressure, Disp: 90 tablet, Rfl: 1  •  Cholecalciferol (VITAMIN D) 50 MCG (2000 UT) capsule, Take 1 capsule by mouth Daily., Disp: , Rfl:   •  glucose blood (ACCU-CHEK TINO PLUS) test strip, Accu-Chek Tino Plus In Vitro Strip; Patient Sig: Accu-Chek Tino Plus In Vitro Strip USE TO TEST DAILY; 1; 0; 09-Nov-2012; Active, Disp: , Rfl:   •  metFORMIN ER (GLUCOPHAGE-XR) 500 MG 24 hr tablet, Take 1 tablet by mouth Daily With Breakfast., Disp: 90 tablet, Rfl: 0  •  metoprolol succinate XL (TOPROL-XL) 100 MG 24 hr tablet, Take 1 tablet by mouth Daily., Disp: 90 tablet, Rfl: 1  •  Multiple Vitamins-Minerals (MULTI VITAMIN/MINERALS PO), Take 1 tablet by mouth daily., Disp: , Rfl:   •  niacin 500 MG tablet, Take 2 tablets by mouth 2 (two) times a day., Disp: , Rfl:   •  Omega-3 Fatty Acids (EQL FISH OIL) 1000 MG capsule, Take  by mouth daily., Disp: , Rfl:   •  pravastatin (PRAVACHOL) 40 MG tablet, Take 1 tablet by mouth Daily., Disp: 90 tablet, Rfl: 1  •  psyllium (METAMUCIL) 0.52 G capsule, Take 3 capsules by mouth 2 (two) times a day., Disp: , Rfl:   •  ursodiol (ACTIGALL) 300 MG capsule, Take 2 capsules by mouth 2 (two) times a day., Disp: , Rfl:   •  Vitamin E 400 UNITS tablet, Take 1 tablet by mouth daily., Disp: , Rfl:       The following portions of the patient's history were reviewed and updated as appropriate: allergies, current medications, past family history, past medical history, past  "social history, past surgical history and problem list.    Review of Systems   Constitutional: Negative.  Negative for chills, fatigue and fever.   HENT: Negative for ear discharge, ear pain, sinus pressure and sore throat.    Respiratory: Negative for cough, chest tightness and shortness of breath.    Cardiovascular: Negative for chest pain, palpitations and leg swelling.   Gastrointestinal: Negative for diarrhea, nausea and vomiting.   Endocrine: Negative for polydipsia, polyphagia and polyuria.   Musculoskeletal: Negative for arthralgias, back pain and myalgias.   Skin: Negative for pallor.   Neurological: Negative for dizziness, tremors, seizures, syncope, speech difficulty, weakness and headaches.   Psychiatric/Behavioral: Negative for confusion and sleep disturbance. The patient is not nervous/anxious.        Objective   /68   Pulse 76   Temp 97.7 °F (36.5 °C)   Ht 152.4 cm (60\")   Wt 90.4 kg (199 lb 6.4 oz)   SpO2 95% Comment: ra  BMI 38.94 kg/m²   Physical Exam   Constitutional: She is oriented to person, place, and time. She appears well-developed and well-nourished.   HENT:   Head: Normocephalic and atraumatic.   Right Ear: External ear normal.   Left Ear: External ear normal.   Mouth/Throat: No oropharyngeal exudate.   Eyes: Pupils are equal, round, and reactive to light. Conjunctivae are normal.   Neck: Neck supple. No thyromegaly present.   Cardiovascular: Normal rate and regular rhythm.   Pulmonary/Chest: Effort normal and breath sounds normal.   Abdominal: Soft. Bowel sounds are normal. She exhibits no distension. There is no tenderness.   Musculoskeletal: She exhibits no edema.   Neurological: She is alert and oriented to person, place, and time. No cranial nerve deficit.   Skin: Skin is warm and dry.   Psychiatric: She has a normal mood and affect. Judgment normal.   Nursing note and vitals reviewed.        Results for orders placed or performed in visit on 07/08/20   POC Glycosylated " Hemoglobin (Hb A1C)   Result Value Ref Range    Hemoglobin A1C 5.8 %             Assessment/Plan   Diagnoses and all orders for this visit:    Type 2 diabetes mellitus with other specified complication, without long-term current use of insulin (CMS/[facility])  -     POC Glycosylated Hemoglobin (Hb A1C)    Mixed hyperlipidemia  -     Hepatic Function Panel; Future  -     Lipid Panel; Future    Benign essential hypertension    Nonalcoholic fatty liver disease  Comments:  followed by Dr. Mccall, check TGL, and if high, add vascepa.    Other orders  -     Cholecalciferol (VITAMIN D) 50 MCG (2000 UT) capsule; Take 1 capsule by mouth Daily.                 Return in about 4 months (around 11/8/2020) for Next scheduled follow up.

## 2020-07-09 LAB
ALBUMIN SERPL-MCNC: 4.7 G/DL (ref 3.5–5.2)
ALP SERPL-CCNC: 73 U/L (ref 39–117)
ALT SERPL W P-5'-P-CCNC: 46 U/L (ref 1–33)
AST SERPL-CCNC: 47 U/L (ref 1–32)
BILIRUB CONJ SERPL-MCNC: 0.2 MG/DL (ref 0–0.3)
BILIRUB INDIRECT SERPL-MCNC: 0.3 MG/DL
BILIRUB SERPL-MCNC: 0.5 MG/DL (ref 0–1.2)
CHOLEST SERPL-MCNC: 155 MG/DL (ref 0–200)
HDLC SERPL-MCNC: 57 MG/DL (ref 40–60)
LDLC SERPL CALC-MCNC: 67 MG/DL (ref 0–100)
LDLC/HDLC SERPL: 1.18 {RATIO}
PROT SERPL-MCNC: 7.8 G/DL (ref 6–8.5)
TRIGL SERPL-MCNC: 153 MG/DL (ref 0–150)
VLDLC SERPL-MCNC: 30.6 MG/DL (ref 5–40)

## 2020-08-31 DIAGNOSIS — E78.2 MIXED HYPERLIPIDEMIA: ICD-10-CM

## 2020-08-31 DIAGNOSIS — I10 BENIGN ESSENTIAL HYPERTENSION: ICD-10-CM

## 2020-08-31 RX ORDER — AMLODIPINE AND OLMESARTAN MEDOXOMIL 10; 40 MG/1; MG/1
TABLET ORAL
Qty: 90 TABLET | Refills: 3 | Status: SHIPPED | OUTPATIENT
Start: 2020-08-31 | End: 2021-09-14

## 2020-08-31 RX ORDER — METOPROLOL SUCCINATE 100 MG/1
TABLET, EXTENDED RELEASE ORAL
Qty: 90 TABLET | Refills: 3 | Status: SHIPPED | OUTPATIENT
Start: 2020-08-31 | End: 2021-09-29

## 2020-08-31 RX ORDER — PRAVASTATIN SODIUM 40 MG
TABLET ORAL
Qty: 90 TABLET | Refills: 3 | Status: SHIPPED | OUTPATIENT
Start: 2020-08-31 | End: 2021-09-29

## 2020-10-07 ENCOUNTER — APPOINTMENT (OUTPATIENT)
Dept: MAMMOGRAPHY | Facility: HOSPITAL | Age: 70
End: 2020-10-07

## 2020-10-15 ENCOUNTER — HOSPITAL ENCOUNTER (OUTPATIENT)
Dept: MAMMOGRAPHY | Facility: HOSPITAL | Age: 70
Discharge: HOME | End: 2020-10-15
Admitting: INTERNAL MEDICINE

## 2020-10-15 DIAGNOSIS — Z12.31 VISIT FOR SCREENING MAMMOGRAM: ICD-10-CM

## 2020-10-15 PROCEDURE — 77067 SCR MAMMO BI INCL CAD: CPT | Performed by: RADIOLOGY

## 2020-10-15 PROCEDURE — 77063 BREAST TOMOSYNTHESIS BI: CPT

## 2020-10-15 PROCEDURE — 77067 SCR MAMMO BI INCL CAD: CPT

## 2020-10-15 PROCEDURE — 77063 BREAST TOMOSYNTHESIS BI: CPT | Performed by: RADIOLOGY

## 2020-11-11 ENCOUNTER — OFFICE VISIT (OUTPATIENT)
Dept: INTERNAL MEDICINE | Facility: CLINIC | Age: 70
End: 2020-11-11

## 2020-11-11 ENCOUNTER — LAB (OUTPATIENT)
Dept: LAB | Facility: HOSPITAL | Age: 70
End: 2020-11-11

## 2020-11-11 VITALS
BODY MASS INDEX: 39.5 KG/M2 | HEART RATE: 83 BPM | WEIGHT: 201.2 LBS | SYSTOLIC BLOOD PRESSURE: 142 MMHG | TEMPERATURE: 97.7 F | OXYGEN SATURATION: 96 % | HEIGHT: 60 IN | DIASTOLIC BLOOD PRESSURE: 80 MMHG

## 2020-11-11 DIAGNOSIS — I10 BENIGN ESSENTIAL HYPERTENSION: ICD-10-CM

## 2020-11-11 DIAGNOSIS — E78.2 MIXED HYPERLIPIDEMIA: ICD-10-CM

## 2020-11-11 DIAGNOSIS — Z78.0 POSTMENOPAUSAL: Primary | ICD-10-CM

## 2020-11-11 DIAGNOSIS — E11.69 TYPE 2 DIABETES MELLITUS WITH OTHER SPECIFIED COMPLICATION, WITHOUT LONG-TERM CURRENT USE OF INSULIN (HCC): ICD-10-CM

## 2020-11-11 LAB — HBA1C MFR BLD: 6 %

## 2020-11-11 PROCEDURE — 83036 HEMOGLOBIN GLYCOSYLATED A1C: CPT | Performed by: INTERNAL MEDICINE

## 2020-11-11 PROCEDURE — 80053 COMPREHEN METABOLIC PANEL: CPT

## 2020-11-11 PROCEDURE — 80061 LIPID PANEL: CPT

## 2020-11-11 PROCEDURE — 99214 OFFICE O/P EST MOD 30 MIN: CPT | Performed by: INTERNAL MEDICINE

## 2020-11-11 RX ORDER — METFORMIN HYDROCHLORIDE 500 MG/1
500 TABLET, EXTENDED RELEASE ORAL
Qty: 90 TABLET | Refills: 1 | Status: SHIPPED | OUTPATIENT
Start: 2020-11-11 | End: 2021-03-10 | Stop reason: SDUPTHER

## 2020-11-11 NOTE — PROGRESS NOTES
Diabetes    Subjective   Claire Lux is a 70 y.o. female is here today for follow-up.    History of Present Illness   Pt. Is here for fasting labs and follow up on her HTN, HLP and DM2.  Answers for HPI/ROS submitted by the patient on 11/7/2020   Diabetes problem  What is the primary reason for your visit?: Diabetes          Current Outpatient Medications:   •  amlodipine-olmesartan (JENNI) 10-40 MG per tablet, TAKE 1 TABLET DAILY FOR BLOOD PRESSURE, Disp: 90 tablet, Rfl: 3  •  Cholecalciferol (VITAMIN D) 50 MCG (2000 UT) capsule, Take 1 capsule by mouth Daily., Disp: , Rfl:   •  glucose blood (ACCU-CHEK TINO PLUS) test strip, Accu-Chek Tino Plus In Vitro Strip; Patient Sig: Accu-Chek Tino Plus In Vitro Strip USE TO TEST DAILY; 1; 0; 09-Nov-2012; Active, Disp: , Rfl:   •  metFORMIN ER (GLUCOPHAGE-XR) 500 MG 24 hr tablet, Take 1 tablet by mouth Daily With Breakfast., Disp: 90 tablet, Rfl: 1  •  metoprolol succinate XL (TOPROL-XL) 100 MG 24 hr tablet, TAKE 1 TABLET DAILY, Disp: 90 tablet, Rfl: 3  •  Multiple Vitamins-Minerals (MULTI VITAMIN/MINERALS PO), Take 1 tablet by mouth daily., Disp: , Rfl:   •  niacin 500 MG tablet, Take 2 tablets by mouth 2 (two) times a day., Disp: , Rfl:   •  Omega-3 Fatty Acids (EQL FISH OIL) 1000 MG capsule, Take  by mouth daily., Disp: , Rfl:   •  pravastatin (PRAVACHOL) 40 MG tablet, TAKE 1 TABLET DAILY, Disp: 90 tablet, Rfl: 3  •  psyllium (METAMUCIL) 0.52 G capsule, Take 3 capsules by mouth 2 (two) times a day., Disp: , Rfl:   •  ursodiol (ACTIGALL) 300 MG capsule, Take 2 capsules by mouth 2 (two) times a day., Disp: , Rfl:   •  Vitamin E 400 UNITS tablet, Take 1 tablet by mouth daily., Disp: , Rfl:       The following portions of the patient's history were reviewed and updated as appropriate: allergies, current medications, past family history, past medical history, past social history, past surgical history and problem list.    Review of Systems   Constitutional: Negative.   "Negative for chills, fatigue and fever.   HENT: Negative for ear discharge, ear pain, sinus pressure and sore throat.    Respiratory: Negative for cough, chest tightness and shortness of breath.    Cardiovascular: Negative for chest pain, palpitations and leg swelling.   Gastrointestinal: Negative for diarrhea, nausea and vomiting.   Endocrine: Negative for polydipsia, polyphagia and polyuria.   Musculoskeletal: Negative for arthralgias, back pain and myalgias.   Skin: Negative for pallor.   Neurological: Negative for dizziness, tremors, seizures, syncope, speech difficulty, weakness and headaches.   Psychiatric/Behavioral: Negative for confusion and sleep disturbance. The patient is not nervous/anxious.        Objective   /80   Pulse 83   Temp 97.7 °F (36.5 °C)   Ht 152.4 cm (60\")   Wt 91.3 kg (201 lb 3.2 oz)   SpO2 96% Comment: ra  BMI 39.29 kg/m²   Physical Exam  Vitals signs and nursing note reviewed.   Constitutional:       Appearance: She is well-developed.   HENT:      Head: Normocephalic and atraumatic.      Right Ear: External ear normal.      Left Ear: External ear normal.      Mouth/Throat:      Pharynx: No oropharyngeal exudate.   Eyes:      Conjunctiva/sclera: Conjunctivae normal.      Pupils: Pupils are equal, round, and reactive to light.   Neck:      Musculoskeletal: Neck supple.      Thyroid: No thyromegaly.   Cardiovascular:      Rate and Rhythm: Normal rate and regular rhythm.   Pulmonary:      Effort: Pulmonary effort is normal.      Breath sounds: Normal breath sounds.   Abdominal:      General: Bowel sounds are normal. There is no distension.      Palpations: Abdomen is soft.      Tenderness: There is no abdominal tenderness.   Skin:     General: Skin is warm and dry.   Neurological:      Mental Status: She is alert and oriented to person, place, and time.      Cranial Nerves: No cranial nerve deficit.   Psychiatric:         Judgment: Judgment normal.           Results for orders " placed or performed in visit on 11/11/20   POC Glycosylated Hemoglobin (Hb A1C)    Specimen: Blood   Result Value Ref Range    Hemoglobin A1C 6.0 %             Assessment/Plan   Diagnoses and all orders for this visit:    Postmenopausal  -     DEXA Bone Density Axial; Future    Type 2 diabetes mellitus with other specified complication, without long-term current use of insulin (CMS/[facility])  -     metFORMIN ER (GLUCOPHAGE-XR) 500 MG 24 hr tablet; Take 1 tablet by mouth Daily With Breakfast.  -     POC Glycosylated Hemoglobin (Hb A1C)    Benign essential hypertension  Comments:  stable, continue current regimen.    Mixed hyperlipidemia  -     Comprehensive Metabolic Panel; Future  -     Lipid Panel; Future                 Return for Medicare Wellness, Next scheduled follow up.

## 2020-11-12 LAB
ALBUMIN SERPL-MCNC: 5 G/DL (ref 3.5–5.2)
ALBUMIN/GLOB SERPL: 1.9 G/DL
ALP SERPL-CCNC: 87 U/L (ref 39–117)
ALT SERPL W P-5'-P-CCNC: 52 U/L (ref 1–33)
ANION GAP SERPL CALCULATED.3IONS-SCNC: 14.5 MMOL/L (ref 5–15)
AST SERPL-CCNC: 65 U/L (ref 1–32)
BILIRUB SERPL-MCNC: 0.7 MG/DL (ref 0–1.2)
BUN SERPL-MCNC: 12 MG/DL (ref 8–23)
BUN/CREAT SERPL: 15.6 (ref 7–25)
CALCIUM SPEC-SCNC: 10.3 MG/DL (ref 8.6–10.5)
CHLORIDE SERPL-SCNC: 102 MMOL/L (ref 98–107)
CHOLEST SERPL-MCNC: 152 MG/DL (ref 0–200)
CO2 SERPL-SCNC: 25.5 MMOL/L (ref 22–29)
CREAT SERPL-MCNC: 0.77 MG/DL (ref 0.57–1)
GFR SERPL CREATININE-BSD FRML MDRD: 74 ML/MIN/1.73
GLOBULIN UR ELPH-MCNC: 2.6 GM/DL
GLUCOSE SERPL-MCNC: 110 MG/DL (ref 65–99)
HDLC SERPL-MCNC: 63 MG/DL (ref 40–60)
LDLC SERPL CALC-MCNC: 64 MG/DL (ref 0–100)
LDLC/HDLC SERPL: 0.95 {RATIO}
POTASSIUM SERPL-SCNC: 4.7 MMOL/L (ref 3.5–5.2)
PROT SERPL-MCNC: 7.6 G/DL (ref 6–8.5)
SODIUM SERPL-SCNC: 142 MMOL/L (ref 136–145)
TRIGL SERPL-MCNC: 146 MG/DL (ref 0–150)
VLDLC SERPL-MCNC: 25 MG/DL (ref 5–40)

## 2021-03-10 ENCOUNTER — OFFICE VISIT (OUTPATIENT)
Dept: INTERNAL MEDICINE | Facility: CLINIC | Age: 71
End: 2021-03-10

## 2021-03-10 ENCOUNTER — LAB (OUTPATIENT)
Dept: LAB | Facility: HOSPITAL | Age: 71
End: 2021-03-10

## 2021-03-10 VITALS
SYSTOLIC BLOOD PRESSURE: 130 MMHG | HEART RATE: 90 BPM | DIASTOLIC BLOOD PRESSURE: 82 MMHG | HEIGHT: 60 IN | TEMPERATURE: 97.7 F | BODY MASS INDEX: 41.23 KG/M2 | WEIGHT: 210 LBS | OXYGEN SATURATION: 96 %

## 2021-03-10 DIAGNOSIS — E78.2 MIXED HYPERLIPIDEMIA: ICD-10-CM

## 2021-03-10 DIAGNOSIS — E83.52 HYPERCALCEMIA: ICD-10-CM

## 2021-03-10 DIAGNOSIS — Z00.00 MEDICARE ANNUAL WELLNESS VISIT, SUBSEQUENT: Primary | ICD-10-CM

## 2021-03-10 DIAGNOSIS — E11.69 TYPE 2 DIABETES MELLITUS WITH OTHER SPECIFIED COMPLICATION, WITHOUT LONG-TERM CURRENT USE OF INSULIN (HCC): ICD-10-CM

## 2021-03-10 LAB
25(OH)D3 SERPL-MCNC: 81 NG/ML
ALBUMIN SERPL-MCNC: 4.8 G/DL (ref 3.5–5.2)
ALBUMIN/GLOB SERPL: 1.5 G/DL
ALP SERPL-CCNC: 87 U/L (ref 39–117)
ALT SERPL W P-5'-P-CCNC: 62 U/L (ref 1–33)
ANION GAP SERPL CALCULATED.3IONS-SCNC: 14 MMOL/L (ref 5–15)
AST SERPL-CCNC: 82 U/L (ref 1–32)
BILIRUB SERPL-MCNC: 0.6 MG/DL (ref 0–1.2)
BUN SERPL-MCNC: 14 MG/DL (ref 8–23)
BUN/CREAT SERPL: 15.7 (ref 7–25)
CA-I BLD-MCNC: 5.2 MG/DL (ref 4.6–5.4)
CA-I SERPL ISE-MCNC: 1.31 MMOL/L (ref 1.15–1.35)
CALCIUM SPEC-SCNC: 10.1 MG/DL (ref 8.6–10.5)
CHLORIDE SERPL-SCNC: 100 MMOL/L (ref 98–107)
CHOLEST SERPL-MCNC: 132 MG/DL (ref 0–200)
CO2 SERPL-SCNC: 26 MMOL/L (ref 22–29)
CREAT SERPL-MCNC: 0.89 MG/DL (ref 0.57–1)
DEPRECATED RDW RBC AUTO: 44.5 FL (ref 37–54)
ERYTHROCYTE [DISTWIDTH] IN BLOOD BY AUTOMATED COUNT: 13.1 % (ref 12.3–15.4)
GFR SERPL CREATININE-BSD FRML MDRD: 62 ML/MIN/1.73
GLOBULIN UR ELPH-MCNC: 3.2 GM/DL
GLUCOSE SERPL-MCNC: 118 MG/DL (ref 65–99)
HBA1C MFR BLD: 5.9 %
HCT VFR BLD AUTO: 47.9 % (ref 34–46.6)
HDLC SERPL-MCNC: 56 MG/DL (ref 40–60)
HGB BLD-MCNC: 16 G/DL (ref 12–15.9)
LDLC SERPL CALC-MCNC: 47 MG/DL (ref 0–100)
LDLC/HDLC SERPL: 0.73 {RATIO}
MCH RBC QN AUTO: 30.6 PG (ref 26.6–33)
MCHC RBC AUTO-ENTMCNC: 33.4 G/DL (ref 31.5–35.7)
MCV RBC AUTO: 91.6 FL (ref 79–97)
PLATELET # BLD AUTO: 262 10*3/MM3 (ref 140–450)
PMV BLD AUTO: 11.9 FL (ref 6–12)
POC CREATININE URINE: 10
POC MICROALBUMIN URINE: 10
POTASSIUM SERPL-SCNC: 4.4 MMOL/L (ref 3.5–5.2)
PROT SERPL-MCNC: 8 G/DL (ref 6–8.5)
RBC # BLD AUTO: 5.23 10*6/MM3 (ref 3.77–5.28)
SODIUM SERPL-SCNC: 140 MMOL/L (ref 136–145)
TRIGL SERPL-MCNC: 177 MG/DL (ref 0–150)
TSH SERPL DL<=0.05 MIU/L-ACNC: 2.94 UIU/ML (ref 0.27–4.2)
VIT B12 BLD-MCNC: 826 PG/ML (ref 211–946)
VLDLC SERPL-MCNC: 29 MG/DL (ref 5–40)
WBC # BLD AUTO: 7.57 10*3/MM3 (ref 3.4–10.8)

## 2021-03-10 PROCEDURE — G0439 PPPS, SUBSEQ VISIT: HCPCS | Performed by: INTERNAL MEDICINE

## 2021-03-10 PROCEDURE — 82044 UR ALBUMIN SEMIQUANTITATIVE: CPT | Performed by: INTERNAL MEDICINE

## 2021-03-10 PROCEDURE — 82306 VITAMIN D 25 HYDROXY: CPT

## 2021-03-10 PROCEDURE — 99397 PER PM REEVAL EST PAT 65+ YR: CPT | Performed by: INTERNAL MEDICINE

## 2021-03-10 PROCEDURE — 83036 HEMOGLOBIN GLYCOSYLATED A1C: CPT | Performed by: INTERNAL MEDICINE

## 2021-03-10 PROCEDURE — 80053 COMPREHEN METABOLIC PANEL: CPT

## 2021-03-10 PROCEDURE — 80061 LIPID PANEL: CPT

## 2021-03-10 PROCEDURE — 82607 VITAMIN B-12: CPT

## 2021-03-10 PROCEDURE — 85027 COMPLETE CBC AUTOMATED: CPT

## 2021-03-10 PROCEDURE — 84443 ASSAY THYROID STIM HORMONE: CPT

## 2021-03-10 PROCEDURE — 82330 ASSAY OF CALCIUM: CPT

## 2021-03-10 PROCEDURE — G0444 DEPRESSION SCREEN ANNUAL: HCPCS | Performed by: INTERNAL MEDICINE

## 2021-03-10 RX ORDER — METFORMIN HYDROCHLORIDE 500 MG/1
500 TABLET, EXTENDED RELEASE ORAL
Qty: 90 TABLET | Refills: 1 | Status: SHIPPED | OUTPATIENT
Start: 2021-03-10 | End: 2021-11-03 | Stop reason: SDUPTHER

## 2021-03-10 NOTE — PROGRESS NOTES
The ABCs of the Annual Wellness Visit  Subsequent Medicare Wellness Visit    Chief Complaint   Patient presents with   • Medicare Wellness-subsequent       Subjective   History of Present Illness:  Claire Lux is a 70 y.o. female who presents for a Subsequent Medicare Wellness Visit.    HEALTH RISK ASSESSMENT    Recent Hospitalizations:  No hospitalization(s) within the last year.    Current Medical Providers:  Patient Care Team:  Rosemarie Marley MD as PCP - General    Smoking Status:  Social History     Tobacco Use   Smoking Status Never Smoker   Smokeless Tobacco Never Used       Alcohol Consumption:  Social History     Substance and Sexual Activity   Alcohol Use No       Depression Screen:   PHQ-2/PHQ-9 Depression Screening 3/10/2021   Little interest or pleasure in doing things 0   Feeling down, depressed, or hopeless 0   Total Score 0       Fall Risk Screen:  STEADI Fall Risk Assessment was completed, and patient is at LOW risk for falls.Assessment completed on:3/10/2021    Health Habits and Functional and Cognitive Screening:  Functional & Cognitive Status 3/10/2021   Do you have difficulty preparing food and eating? No   Do you have difficulty bathing yourself, getting dressed or grooming yourself? No   Do you have difficulty using the toilet? No   Do you have difficulty moving around from place to place? No   Do you have trouble with steps or getting out of a bed or a chair? No   Current Diet Well Balanced Diet   Dental Exam Not up to date   Eye Exam Up to date   Exercise (times per week) 7 times per week   Current Exercise Activities Include Housecleaning   Do you need help using the phone?  No   Are you deaf or do you have serious difficulty hearing?  No   Do you need help with transportation? Yes   Do you need help shopping? No   Do you need help preparing meals?  No   Do you need help with housework?  No   Do you need help with laundry? No   Do you need help taking your medications? No   Do you  need help managing money? No   Do you ever drive or ride in a car without wearing a seat belt? No   Have you felt unusual stress, anger or loneliness in the last month? No   Who do you live with? Sibling   If you need help, do you have trouble finding someone available to you? No   Have you been bothered in the last four weeks by sexual problems? No   Do you have difficulty concentrating, remembering or making decisions? No         Does the patient have evidence of cognitive impairment? No    Asprin use counseling:Taking ASA appropriately as indicated    Age-appropriate Screening Schedule:  Refer to the list below for future screening recommendations based on patient's age, sex and/or medical conditions. Orders for these recommended tests are listed in the plan section. The patient has been provided with a written plan.    Health Maintenance   Topic Date Due   • DXA SCAN  10/23/2016   • ZOSTER VACCINE (2 of 2) 11/16/2017   • COLONOSCOPY  04/06/2021 (Originally 8/10/2020)   • HEMOGLOBIN A1C  09/10/2021   • MAMMOGRAM  10/15/2021   • DIABETIC FOOT EXAM  03/10/2022   • LIPID PANEL  03/10/2022   • DIABETIC EYE EXAM  03/10/2022   • URINE MICROALBUMIN  03/10/2022   • INFLUENZA VACCINE  Completed   • TDAP/TD VACCINES  Discontinued          The following portions of the patient's history were reviewed and updated as appropriate: allergies, current medications, past family history, past medical history, past social history, past surgical history and problem list.    Outpatient Medications Prior to Visit   Medication Sig Dispense Refill   • amlodipine-olmesartan (JENNI) 10-40 MG per tablet TAKE 1 TABLET DAILY FOR BLOOD PRESSURE 90 tablet 3   • Cholecalciferol (VITAMIN D) 50 MCG (2000 UT) capsule Take 1 capsule by mouth Daily.     • glucose blood (ACCU-CHEK TINO PLUS) test strip Accu-Chek Tino Plus In Vitro Strip; Patient Sig: Accu-Chek Tino Plus In Vitro Strip USE TO TEST DAILY; 1; 0; 09-Nov-2012; Active     • metoprolol  succinate XL (TOPROL-XL) 100 MG 24 hr tablet TAKE 1 TABLET DAILY 90 tablet 3   • Multiple Vitamins-Minerals (MULTI VITAMIN/MINERALS PO) Take 1 tablet by mouth daily.     • niacin 500 MG tablet Take 2 tablets by mouth 2 (two) times a day.     • Omega-3 Fatty Acids (EQL FISH OIL) 1000 MG capsule Take  by mouth daily.     • pravastatin (PRAVACHOL) 40 MG tablet TAKE 1 TABLET DAILY 90 tablet 3   • psyllium (METAMUCIL) 0.52 G capsule Take 3 capsules by mouth 2 (two) times a day.     • ursodiol (ACTIGALL) 300 MG capsule Take 2 capsules by mouth 2 (two) times a day.     • Vitamin E 400 UNITS tablet Take 1 tablet by mouth daily.     • metFORMIN ER (GLUCOPHAGE-XR) 500 MG 24 hr tablet Take 1 tablet by mouth Daily With Breakfast. 90 tablet 1     No facility-administered medications prior to visit.       Patient Active Problem List   Diagnosis   • Abnormal weight gain   • Benign essential hypertension   • Hypercalcemia   • Hyperlipidemia   • Nonalcoholic fatty liver disease   • Type 2 diabetes mellitus (CMS/HCC)   • Postmenopausal disorder       Advanced Care Planning:  ACP discussion was held with the patient during this visit. Patient has an advance directive in EMR which is still valid.     Review of Systems   Constitutional: Negative.  Negative for chills, fatigue and fever.   HENT: Negative for ear discharge, ear pain, sinus pressure and sore throat.    Respiratory: Negative for cough, chest tightness and shortness of breath.    Cardiovascular: Negative for chest pain, palpitations and leg swelling.   Gastrointestinal: Negative for diarrhea, nausea and vomiting.   Endocrine: Negative for polydipsia, polyphagia and polyuria.   Musculoskeletal: Negative for arthralgias, back pain and myalgias.   Skin: Negative for pallor.   Neurological: Negative for dizziness, tremors, seizures, syncope, speech difficulty, weakness and headaches.   Psychiatric/Behavioral: Negative for confusion and sleep disturbance. The patient is not  "nervous/anxious.        Compared to one year ago, the patient feels her physical health is better.  Compared to one year ago, the patient feels her mental health is better.    Reviewed chart for potential of high risk medication in the elderly: yes  Reviewed chart for potential of harmful drug interactions in the elderly:yes    Objective         Vitals:    03/10/21 0956   BP: 130/82   Pulse: 90   Temp: 97.7 °F (36.5 °C)   SpO2: 96%  Comment: ra   Weight: 95.3 kg (210 lb)   Height: 152.4 cm (60\")   PainSc: 0-No pain       Body mass index is 41.01 kg/m².  Discussed the patient's BMI with her. The BMI is above average; BMI management plan is completed.    Physical Exam  Vitals and nursing note reviewed.   Constitutional:       Appearance: She is well-developed.   HENT:      Head: Normocephalic and atraumatic.      Right Ear: External ear normal.      Left Ear: External ear normal.      Mouth/Throat:      Pharynx: No oropharyngeal exudate.   Eyes:      Conjunctiva/sclera: Conjunctivae normal.      Pupils: Pupils are equal, round, and reactive to light.   Neck:      Thyroid: No thyromegaly.   Cardiovascular:      Rate and Rhythm: Normal rate and regular rhythm.   Pulmonary:      Effort: Pulmonary effort is normal.      Breath sounds: Normal breath sounds.   Abdominal:      General: Bowel sounds are normal. There is no distension.      Palpations: Abdomen is soft.      Tenderness: There is no abdominal tenderness.   Musculoskeletal:      Cervical back: Neck supple.   Skin:     General: Skin is warm and dry.   Neurological:      Mental Status: She is alert and oriented to person, place, and time.      Cranial Nerves: No cranial nerve deficit.   Psychiatric:         Judgment: Judgment normal.         Lab Results   Component Value Date    TRIG 177 (H) 03/10/2021    HDL 56 03/10/2021    LDL 47 03/10/2021    VLDL 29 03/10/2021    HGBA1C 5.9 03/10/2021        Assessment/Plan   Medicare Risks and Personalized Health Plan  CMS " Preventative Services Quick Reference  Breast Cancer/Mammogram Screening  Diabetic Lab Screening   Fall Risk  Immunizations Discussed/Encouraged (specific immunizations; Shingrix )  Obesity/Overweight   Osteoprorosis Risk    The above risks/problems have been discussed with the patient.  Pertinent information has been shared with the patient in the After Visit Summary.  Follow up plans and orders are seen below in the Assessment/Plan Section.    Medicare Wellness-subsequent    Subjective   Claire Lux is a 70 y.o. female is here today for follow-up.  HPI  Has lab orders per Dr. Mccall, for her SMALLS. Here for a f/u on her dm2.    Answers for HPI/ROS submitted by the patient on 3/4/2021  What is the primary reason for your visit?: Diabetes        Current Outpatient Medications:   •  amlodipine-olmesartan (JENNI) 10-40 MG per tablet, TAKE 1 TABLET DAILY FOR BLOOD PRESSURE, Disp: 90 tablet, Rfl: 3  •  Cholecalciferol (VITAMIN D) 50 MCG (2000 UT) capsule, Take 1 capsule by mouth Daily., Disp: , Rfl:   •  glucose blood (ACCU-CHEK TINO PLUS) test strip, Accu-Chek Tino Plus In Vitro Strip; Patient Sig: Accu-Chek Tino Plus In Vitro Strip USE TO TEST DAILY; 1; 0; 09-Nov-2012; Active, Disp: , Rfl:   •  metFORMIN ER (GLUCOPHAGE-XR) 500 MG 24 hr tablet, Take 1 tablet by mouth Daily With Breakfast., Disp: 90 tablet, Rfl: 1  •  metoprolol succinate XL (TOPROL-XL) 100 MG 24 hr tablet, TAKE 1 TABLET DAILY, Disp: 90 tablet, Rfl: 3  •  Multiple Vitamins-Minerals (MULTI VITAMIN/MINERALS PO), Take 1 tablet by mouth daily., Disp: , Rfl:   •  niacin 500 MG tablet, Take 2 tablets by mouth 2 (two) times a day., Disp: , Rfl:   •  Omega-3 Fatty Acids (EQL FISH OIL) 1000 MG capsule, Take  by mouth daily., Disp: , Rfl:   •  pravastatin (PRAVACHOL) 40 MG tablet, TAKE 1 TABLET DAILY, Disp: 90 tablet, Rfl: 3  •  psyllium (METAMUCIL) 0.52 G capsule, Take 3 capsules by mouth 2 (two) times a day., Disp: , Rfl:   •  ursodiol (ACTIGALL) 300 MG  "capsule, Take 2 capsules by mouth 2 (two) times a day., Disp: , Rfl:   •  Vitamin E 400 UNITS tablet, Take 1 tablet by mouth daily., Disp: , Rfl:       The following portions of the patient's history were reviewed and updated as appropriate: allergies, current medications, past family history, past medical history, past social history, past surgical history and problem list.        Objective   /82   Pulse 90   Temp 97.7 °F (36.5 °C)   Ht 152.4 cm (60\")   Wt 95.3 kg (210 lb)   SpO2 96% Comment: ra  BMI 41.01 kg/m²         Results for orders placed or performed in visit on 03/10/21   POC Glycosylated Hemoglobin (Hb A1C)    Specimen: Blood   Result Value Ref Range    Hemoglobin A1C 5.9 %   POC Microalbumin    Specimen: Urine   Result Value Ref Range    Microalbumin, Urine 10     Creatinine, Urine 10              Assessment/Plan   Diagnoses and all orders for this visit:    Medicare annual wellness visit, subsequent    Type 2 diabetes mellitus with other specified complication, without long-term current use of insulin (CMS/[facility])  -     metFORMIN ER (GLUCOPHAGE-XR) 500 MG 24 hr tablet; Take 1 tablet by mouth Daily With Breakfast.  -     POC Glycosylated Hemoglobin (Hb A1C)  -     POC Microalbumin    Mixed hyperlipidemia  -     Comprehensive Metabolic Panel; Future  -     Lipid Panel; Future  -     TSH; Future    Hypercalcemia  -     CBC (No Diff); Future  -     Vitamin D 25 Hydroxy; Future  -     Vitamin B12; Future  -     Calcium, Ionized; Future               PHQ-2/PHQ-9 Depression screening reviewed with patient . Total time spent today for depression screening  with Claire Lux  was 15 minutes in counseling, along with plans for any diagnositc work-up and treatment.    Follow Up:  Return in about 6 months (around 9/10/2021) for Next scheduled follow up.     An After Visit Summary and PPPS were given to the patient.             "

## 2021-09-14 DIAGNOSIS — I10 BENIGN ESSENTIAL HYPERTENSION: ICD-10-CM

## 2021-09-14 RX ORDER — AMLODIPINE AND OLMESARTAN MEDOXOMIL 10; 40 MG/1; MG/1
TABLET ORAL
Qty: 90 TABLET | Refills: 1 | Status: SHIPPED | OUTPATIENT
Start: 2021-09-14 | End: 2022-03-31

## 2021-09-29 DIAGNOSIS — E78.2 MIXED HYPERLIPIDEMIA: ICD-10-CM

## 2021-09-29 DIAGNOSIS — I10 BENIGN ESSENTIAL HYPERTENSION: ICD-10-CM

## 2021-09-29 RX ORDER — PRAVASTATIN SODIUM 40 MG
TABLET ORAL
Qty: 90 TABLET | Refills: 1 | Status: SHIPPED | OUTPATIENT
Start: 2021-09-29 | End: 2022-05-11 | Stop reason: SDUPTHER

## 2021-09-29 RX ORDER — METOPROLOL SUCCINATE 100 MG/1
TABLET, EXTENDED RELEASE ORAL
Qty: 90 TABLET | Refills: 1 | Status: SHIPPED | OUTPATIENT
Start: 2021-09-29 | End: 2022-05-11 | Stop reason: SDUPTHER

## 2021-10-11 ENCOUNTER — TRANSCRIBE ORDERS (OUTPATIENT)
Dept: ADMINISTRATIVE | Facility: HOSPITAL | Age: 71
End: 2021-10-11

## 2021-10-11 DIAGNOSIS — Z78.0 MENOPAUSE: ICD-10-CM

## 2021-10-11 DIAGNOSIS — Z12.31 ENCOUNTER FOR SCREENING MAMMOGRAM FOR MALIGNANT NEOPLASM OF BREAST: Primary | ICD-10-CM

## 2021-11-03 ENCOUNTER — OFFICE VISIT (OUTPATIENT)
Dept: INTERNAL MEDICINE | Facility: CLINIC | Age: 71
End: 2021-11-03

## 2021-11-03 ENCOUNTER — LAB (OUTPATIENT)
Dept: LAB | Facility: HOSPITAL | Age: 71
End: 2021-11-03

## 2021-11-03 ENCOUNTER — TRANSCRIBE ORDERS (OUTPATIENT)
Dept: LAB | Facility: HOSPITAL | Age: 71
End: 2021-11-03

## 2021-11-03 VITALS
HEIGHT: 60 IN | HEART RATE: 86 BPM | WEIGHT: 205.4 LBS | BODY MASS INDEX: 40.33 KG/M2 | SYSTOLIC BLOOD PRESSURE: 130 MMHG | DIASTOLIC BLOOD PRESSURE: 82 MMHG | OXYGEN SATURATION: 96 %

## 2021-11-03 DIAGNOSIS — Z23 NEED FOR INFLUENZA VACCINATION: ICD-10-CM

## 2021-11-03 DIAGNOSIS — K75.81 NONALCOHOLIC STEATOHEPATITIS: ICD-10-CM

## 2021-11-03 DIAGNOSIS — E78.2 MIXED HYPERLIPIDEMIA: ICD-10-CM

## 2021-11-03 DIAGNOSIS — E66.9 OBESITY, UNSPECIFIED CLASSIFICATION, UNSPECIFIED OBESITY TYPE, UNSPECIFIED WHETHER SERIOUS COMORBIDITY PRESENT: Primary | ICD-10-CM

## 2021-11-03 DIAGNOSIS — D58.2 ELEVATED HEMOGLOBIN (HCC): ICD-10-CM

## 2021-11-03 DIAGNOSIS — E11.69 TYPE 2 DIABETES MELLITUS WITH OTHER SPECIFIED COMPLICATION, WITHOUT LONG-TERM CURRENT USE OF INSULIN (HCC): Primary | ICD-10-CM

## 2021-11-03 DIAGNOSIS — E13.69 OTHER SPECIFIED DIABETES MELLITUS WITH OTHER SPECIFIED COMPLICATION, UNSPECIFIED WHETHER LONG TERM INSULIN USE (HCC): ICD-10-CM

## 2021-11-03 DIAGNOSIS — E66.9 OBESITY, UNSPECIFIED CLASSIFICATION, UNSPECIFIED OBESITY TYPE, UNSPECIFIED WHETHER SERIOUS COMORBIDITY PRESENT: ICD-10-CM

## 2021-11-03 LAB
BASOPHILS # BLD AUTO: 0.05 10*3/MM3 (ref 0–0.2)
BASOPHILS NFR BLD AUTO: 0.9 % (ref 0–1.5)
DEPRECATED RDW RBC AUTO: 40.4 FL (ref 37–54)
EOSINOPHIL # BLD AUTO: 0.09 10*3/MM3 (ref 0–0.4)
EOSINOPHIL NFR BLD AUTO: 1.7 % (ref 0.3–6.2)
ERYTHROCYTE [DISTWIDTH] IN BLOOD BY AUTOMATED COUNT: 12.5 % (ref 12.3–15.4)
EXPIRATION DATE: ABNORMAL
HBA1C MFR BLD: 6 %
HCT VFR BLD AUTO: 45 % (ref 34–46.6)
HGB BLD-MCNC: 15.4 G/DL (ref 12–15.9)
IMM GRANULOCYTES # BLD AUTO: 0.02 10*3/MM3 (ref 0–0.05)
IMM GRANULOCYTES NFR BLD AUTO: 0.4 % (ref 0–0.5)
INR PPP: 1.13 (ref 0.85–1.16)
LYMPHOCYTES # BLD AUTO: 1.3 10*3/MM3 (ref 0.7–3.1)
LYMPHOCYTES NFR BLD AUTO: 24.4 % (ref 19.6–45.3)
Lab: ABNORMAL
MCH RBC QN AUTO: 30.6 PG (ref 26.6–33)
MCHC RBC AUTO-ENTMCNC: 34.2 G/DL (ref 31.5–35.7)
MCV RBC AUTO: 89.5 FL (ref 79–97)
MONOCYTES # BLD AUTO: 0.36 10*3/MM3 (ref 0.1–0.9)
MONOCYTES NFR BLD AUTO: 6.8 % (ref 5–12)
NEUTROPHILS NFR BLD AUTO: 3.51 10*3/MM3 (ref 1.7–7)
NEUTROPHILS NFR BLD AUTO: 65.8 % (ref 42.7–76)
NRBC BLD AUTO-RTO: 0 /100 WBC (ref 0–0.2)
PLATELET # BLD AUTO: 215 10*3/MM3 (ref 140–450)
PMV BLD AUTO: 12.2 FL (ref 6–12)
PROTHROMBIN TIME: 14.1 SECONDS (ref 11.4–14.4)
RBC # BLD AUTO: 5.03 10*6/MM3 (ref 3.77–5.28)
WBC # BLD AUTO: 5.33 10*3/MM3 (ref 3.4–10.8)

## 2021-11-03 PROCEDURE — 3044F HG A1C LEVEL LT 7.0%: CPT | Performed by: INTERNAL MEDICINE

## 2021-11-03 PROCEDURE — 99214 OFFICE O/P EST MOD 30 MIN: CPT | Performed by: INTERNAL MEDICINE

## 2021-11-03 PROCEDURE — 80061 LIPID PANEL: CPT

## 2021-11-03 PROCEDURE — 36415 COLL VENOUS BLD VENIPUNCTURE: CPT

## 2021-11-03 PROCEDURE — G0008 ADMIN INFLUENZA VIRUS VAC: HCPCS | Performed by: INTERNAL MEDICINE

## 2021-11-03 PROCEDURE — 85025 COMPLETE CBC W/AUTO DIFF WBC: CPT

## 2021-11-03 PROCEDURE — 83036 HEMOGLOBIN GLYCOSYLATED A1C: CPT | Performed by: INTERNAL MEDICINE

## 2021-11-03 PROCEDURE — 90732 PPSV23 VACC 2 YRS+ SUBQ/IM: CPT | Performed by: INTERNAL MEDICINE

## 2021-11-03 PROCEDURE — 90662 IIV NO PRSV INCREASED AG IM: CPT | Performed by: INTERNAL MEDICINE

## 2021-11-03 PROCEDURE — 85610 PROTHROMBIN TIME: CPT

## 2021-11-03 PROCEDURE — 80053 COMPREHEN METABOLIC PANEL: CPT

## 2021-11-03 PROCEDURE — 84443 ASSAY THYROID STIM HORMONE: CPT

## 2021-11-03 PROCEDURE — G0009 ADMIN PNEUMOCOCCAL VACCINE: HCPCS | Performed by: INTERNAL MEDICINE

## 2021-11-03 RX ORDER — METFORMIN HYDROCHLORIDE 500 MG/1
500 TABLET, EXTENDED RELEASE ORAL
Qty: 90 TABLET | Refills: 1 | Status: SHIPPED | OUTPATIENT
Start: 2021-11-03 | End: 2022-04-19

## 2021-11-03 NOTE — PROGRESS NOTES
Immunization  Immunization performed in [facility] by Peyton Terry MA. Patient tolerated the procedure well without complications.  11/03/21   Peyton Terry MA

## 2021-11-03 NOTE — PROGRESS NOTES
Diabetes    Subjective   Claire Lux is a 71 y.o. female is here today for follow-up.    History of Present Illness   Here for a f/u on feliciano dm2, hlp ad SMALLS. Doing well on current regimen.    Answers for HPI/ROS submitted by the patient on 10/27/2021  What is the primary reason for your visit?: Diabetes        Current Outpatient Medications:   •  amlodipine-olmesartan (JENNI) 10-40 MG per tablet, TAKE 1 TABLET DAILY FOR BLOOD PRESSURE, Disp: 90 tablet, Rfl: 1  •  Cholecalciferol (VITAMIN D) 50 MCG (2000 UT) capsule, Take 1 capsule by mouth Daily., Disp: , Rfl:   •  glucose blood (ACCU-CHEK PRICILA PLUS) test strip, Accu-Chek Pricila Plus In Vitro Strip; Patient Sig: Accu-Chek Pricila Plus In Vitro Strip USE TO TEST DAILY; 1; 0; 09-Nov-2012; Active, Disp: , Rfl:   •  metFORMIN ER (GLUCOPHAGE-XR) 500 MG 24 hr tablet, Take 1 tablet by mouth Daily With Breakfast., Disp: 90 tablet, Rfl: 1  •  metoprolol succinate XL (TOPROL-XL) 100 MG 24 hr tablet, TAKE 1 TABLET DAILY, Disp: 90 tablet, Rfl: 1  •  Multiple Vitamins-Minerals (MULTI VITAMIN/MINERALS PO), Take 1 tablet by mouth daily., Disp: , Rfl:   •  niacin 500 MG tablet, Take 2 tablets by mouth 2 (two) times a day., Disp: , Rfl:   •  Omega-3 Fatty Acids (EQL FISH OIL) 1000 MG capsule, Take  by mouth daily., Disp: , Rfl:   •  pravastatin (PRAVACHOL) 40 MG tablet, TAKE 1 TABLET DAILY, Disp: 90 tablet, Rfl: 1  •  psyllium (METAMUCIL) 0.52 G capsule, Take 3 capsules by mouth 2 (two) times a day., Disp: , Rfl:   •  ursodiol (ACTIGALL) 300 MG capsule, Take 2 capsules by mouth 2 (two) times a day., Disp: , Rfl:   •  Vitamin E 400 UNITS tablet, Take 1 tablet by mouth daily., Disp: , Rfl:       The following portions of the patient's history were reviewed and updated as appropriate: allergies, current medications, past family history, past medical history, past social history, past surgical history and problem list.    Review of Systems   Constitutional: Negative.  Negative for  "chills, fatigue and fever.   HENT: Negative for ear discharge, ear pain, sinus pressure and sore throat.    Respiratory: Negative for cough, chest tightness and shortness of breath.    Cardiovascular: Negative for chest pain, palpitations and leg swelling.   Gastrointestinal: Negative for diarrhea, nausea and vomiting.   Endocrine: Negative for polydipsia, polyphagia and polyuria.   Musculoskeletal: Negative for arthralgias, back pain and myalgias.   Skin: Negative for pallor.   Neurological: Negative for dizziness, tremors, seizures, syncope, speech difficulty, weakness and headaches.   Psychiatric/Behavioral: Negative for confusion and sleep disturbance. The patient is not nervous/anxious.        Objective   /82   Pulse 86   Ht 152.4 cm (60\")   Wt 93.2 kg (205 lb 6.4 oz)   SpO2 96% Comment: RA  BMI 40.11 kg/m²   Physical Exam  Vitals and nursing note reviewed.   Constitutional:       Appearance: She is well-developed.   HENT:      Head: Normocephalic and atraumatic.      Right Ear: External ear normal.      Left Ear: External ear normal.      Mouth/Throat:      Pharynx: No oropharyngeal exudate.   Eyes:      Conjunctiva/sclera: Conjunctivae normal.      Pupils: Pupils are equal, round, and reactive to light.   Neck:      Thyroid: No thyromegaly.   Cardiovascular:      Rate and Rhythm: Normal rate and regular rhythm.      Pulses: Normal pulses.      Heart sounds: Normal heart sounds. No murmur heard.  No friction rub. No gallop.    Pulmonary:      Effort: Pulmonary effort is normal.      Breath sounds: Normal breath sounds.   Abdominal:      General: Bowel sounds are normal. There is no distension.      Palpations: Abdomen is soft.      Tenderness: There is no abdominal tenderness.   Musculoskeletal:      Cervical back: Neck supple.   Skin:     General: Skin is warm and dry.   Neurological:      Mental Status: She is alert and oriented to person, place, and time.      Cranial Nerves: No cranial nerve " deficit.   Psychiatric:         Judgment: Judgment normal.           Results for orders placed or performed in visit on 11/03/21   POC Glycosylated Hemoglobin (Hb A1C)    Specimen: Blood   Result Value Ref Range    Hemoglobin A1C 6.0 %    Lot Number [id number]     Expiration Date 6/28/23              Assessment/Plan   Diagnoses and all orders for this visit:    Type 2 diabetes mellitus with other specified complication, without long-term current use of insulin (HCC)  -     POC Glycosylated Hemoglobin (Hb A1C)  -     metFORMIN ER (GLUCOPHAGE-XR) 500 MG 24 hr tablet; Take 1 tablet by mouth Daily With Breakfast.  -     Pneumococcal Polysaccharide Vaccine 23-Valent (PPSV23) Greater Than or Equal To 1yo Subcutaneous / IM    Need for influenza vaccination  -     Fluzone High-Dose 65+yrs    Mixed hyperlipidemia  -     Lipid Panel; Future  -     TSH; Future    Elevated hemoglobin (HCC)      CHECK LABS AND IF CBC HIGH, WILL REFER FOR SLEEP STUDY           Return in about 6 months (around 5/3/2022) for Medicare Wellness.    Electronically signed by:    Rosemarie Marley MD

## 2021-11-04 LAB
ALBUMIN SERPL-MCNC: 4.8 G/DL (ref 3.5–5.2)
ALBUMIN/GLOB SERPL: 1.7 G/DL
ALP SERPL-CCNC: 98 U/L (ref 39–117)
ALT SERPL W P-5'-P-CCNC: 60 U/L (ref 1–33)
ANION GAP SERPL CALCULATED.3IONS-SCNC: 12.1 MMOL/L (ref 5–15)
AST SERPL-CCNC: 74 U/L (ref 1–32)
BILIRUB SERPL-MCNC: 0.6 MG/DL (ref 0–1.2)
BUN SERPL-MCNC: 9 MG/DL (ref 8–23)
BUN/CREAT SERPL: 10.7 (ref 7–25)
CALCIUM SPEC-SCNC: 10.7 MG/DL (ref 8.6–10.5)
CHLORIDE SERPL-SCNC: 102 MMOL/L (ref 98–107)
CHOLEST SERPL-MCNC: 144 MG/DL (ref 0–200)
CO2 SERPL-SCNC: 24.9 MMOL/L (ref 22–29)
CREAT SERPL-MCNC: 0.84 MG/DL (ref 0.57–1)
GFR SERPL CREATININE-BSD FRML MDRD: 66 ML/MIN/1.73
GLOBULIN UR ELPH-MCNC: 2.8 GM/DL
GLUCOSE SERPL-MCNC: 145 MG/DL (ref 65–99)
HDLC SERPL-MCNC: 59 MG/DL (ref 40–60)
LDLC SERPL CALC-MCNC: 61 MG/DL (ref 0–100)
LDLC/HDLC SERPL: 0.96 {RATIO}
POTASSIUM SERPL-SCNC: 4.4 MMOL/L (ref 3.5–5.2)
PROT SERPL-MCNC: 7.6 G/DL (ref 6–8.5)
SODIUM SERPL-SCNC: 139 MMOL/L (ref 136–145)
TRIGL SERPL-MCNC: 142 MG/DL (ref 0–150)
TSH SERPL DL<=0.05 MIU/L-ACNC: 3.21 UIU/ML (ref 0.27–4.2)
VLDLC SERPL-MCNC: 24 MG/DL (ref 5–40)

## 2022-01-10 ENCOUNTER — TELEPHONE (OUTPATIENT)
Dept: INTERNAL MEDICINE | Facility: CLINIC | Age: 72
End: 2022-01-10

## 2022-01-10 DIAGNOSIS — Z78.0 POSTMENOPAUSAL: Primary | ICD-10-CM

## 2022-01-10 NOTE — TELEPHONE ENCOUNTER
Caller: YANCY    Relationship: [facility]    Best call back number:     What orders are you requesting (i.e. lab or imaging): BONE DENSITY SCAN    Additional notes: YANCY STATED THE PATIENT IS SCHEDULED FOR THIS ON 1/12/22 BUT THEY HAVE NOT RECEIVED AN ORDER FOR IT YET. PLEASE FAX THIS ORDER TO:  [phone]

## 2022-01-11 NOTE — TELEPHONE ENCOUNTER
Sania calls back, she needs the order to be signed in the chart today for pt to be able to stay on schedule for tomorrow.    Please sign Dexa order,    Thank you

## 2022-01-12 ENCOUNTER — HOSPITAL ENCOUNTER (OUTPATIENT)
Dept: MAMMOGRAPHY | Facility: HOSPITAL | Age: 72
Discharge: HOME | End: 2022-01-12

## 2022-01-12 ENCOUNTER — HOSPITAL ENCOUNTER (OUTPATIENT)
Dept: BONE DENSITY | Facility: HOSPITAL | Age: 72
Discharge: HOME | End: 2022-01-12

## 2022-01-12 DIAGNOSIS — Z12.31 ENCOUNTER FOR SCREENING MAMMOGRAM FOR MALIGNANT NEOPLASM OF BREAST: ICD-10-CM

## 2022-01-12 DIAGNOSIS — Z78.0 MENOPAUSE: ICD-10-CM

## 2022-01-12 PROCEDURE — 77080 DXA BONE DENSITY AXIAL: CPT

## 2022-01-12 PROCEDURE — 77067 SCR MAMMO BI INCL CAD: CPT | Performed by: RADIOLOGY

## 2022-01-12 PROCEDURE — 77067 SCR MAMMO BI INCL CAD: CPT

## 2022-01-12 PROCEDURE — 77063 BREAST TOMOSYNTHESIS BI: CPT | Performed by: RADIOLOGY

## 2022-01-12 PROCEDURE — 77063 BREAST TOMOSYNTHESIS BI: CPT

## 2022-03-31 DIAGNOSIS — I10 BENIGN ESSENTIAL HYPERTENSION: ICD-10-CM

## 2022-03-31 RX ORDER — AMLODIPINE AND OLMESARTAN MEDOXOMIL 10; 40 MG/1; MG/1
TABLET ORAL
Qty: 90 TABLET | Refills: 0 | Status: SHIPPED | OUTPATIENT
Start: 2022-03-31 | End: 2022-05-11 | Stop reason: SDUPTHER

## 2022-04-19 DIAGNOSIS — E11.69 TYPE 2 DIABETES MELLITUS WITH OTHER SPECIFIED COMPLICATION, WITHOUT LONG-TERM CURRENT USE OF INSULIN: ICD-10-CM

## 2022-04-19 RX ORDER — METFORMIN HYDROCHLORIDE 500 MG/1
TABLET, EXTENDED RELEASE ORAL
Qty: 90 TABLET | Refills: 0 | Status: SHIPPED | OUTPATIENT
Start: 2022-04-19 | End: 2022-05-11 | Stop reason: SDUPTHER

## 2022-05-11 ENCOUNTER — OFFICE VISIT (OUTPATIENT)
Dept: INTERNAL MEDICINE | Facility: CLINIC | Age: 72
End: 2022-05-11

## 2022-05-11 ENCOUNTER — LAB (OUTPATIENT)
Dept: LAB | Facility: HOSPITAL | Age: 72
End: 2022-05-11

## 2022-05-11 VITALS
OXYGEN SATURATION: 97 % | SYSTOLIC BLOOD PRESSURE: 122 MMHG | HEIGHT: 60 IN | DIASTOLIC BLOOD PRESSURE: 80 MMHG | HEART RATE: 86 BPM | BODY MASS INDEX: 39.3 KG/M2 | WEIGHT: 200.2 LBS

## 2022-05-11 DIAGNOSIS — K76.0 NONALCOHOLIC FATTY LIVER DISEASE: ICD-10-CM

## 2022-05-11 DIAGNOSIS — E55.9 VITAMIN D DEFICIENCY: ICD-10-CM

## 2022-05-11 DIAGNOSIS — D58.2 ELEVATED HEMOGLOBIN: ICD-10-CM

## 2022-05-11 DIAGNOSIS — E78.2 MIXED HYPERLIPIDEMIA: ICD-10-CM

## 2022-05-11 DIAGNOSIS — I10 BENIGN ESSENTIAL HYPERTENSION: ICD-10-CM

## 2022-05-11 DIAGNOSIS — E11.69 TYPE 2 DIABETES MELLITUS WITH OTHER SPECIFIED COMPLICATION, WITHOUT LONG-TERM CURRENT USE OF INSULIN: ICD-10-CM

## 2022-05-11 DIAGNOSIS — Z00.00 MEDICARE ANNUAL WELLNESS VISIT, SUBSEQUENT: Primary | ICD-10-CM

## 2022-05-11 LAB
25(OH)D3 SERPL-MCNC: 90.5 NG/ML (ref 30–100)
ALBUMIN SERPL-MCNC: 4.7 G/DL (ref 3.5–5.2)
ALBUMIN/GLOB SERPL: 1.6 G/DL
ALP SERPL-CCNC: 95 U/L (ref 39–117)
ALT SERPL W P-5'-P-CCNC: 46 U/L (ref 1–33)
ANION GAP SERPL CALCULATED.3IONS-SCNC: 12.7 MMOL/L (ref 5–15)
AST SERPL-CCNC: 70 U/L (ref 1–32)
BILIRUB SERPL-MCNC: 0.9 MG/DL (ref 0–1.2)
BUN SERPL-MCNC: 12 MG/DL (ref 8–23)
BUN/CREAT SERPL: 13.6 (ref 7–25)
CALCIUM SPEC-SCNC: 10.2 MG/DL (ref 8.6–10.5)
CHLORIDE SERPL-SCNC: 100 MMOL/L (ref 98–107)
CHOLEST SERPL-MCNC: 143 MG/DL (ref 0–200)
CO2 SERPL-SCNC: 24.3 MMOL/L (ref 22–29)
CREAT SERPL-MCNC: 0.88 MG/DL (ref 0.57–1)
DEPRECATED RDW RBC AUTO: 42 FL (ref 37–54)
EGFRCR SERPLBLD CKD-EPI 2021: 69.5 ML/MIN/1.73
ERYTHROCYTE [DISTWIDTH] IN BLOOD BY AUTOMATED COUNT: 12.9 % (ref 12.3–15.4)
EXPIRATION DATE: ABNORMAL
GLOBULIN UR ELPH-MCNC: 3 GM/DL
GLUCOSE SERPL-MCNC: 145 MG/DL (ref 65–99)
HBA1C MFR BLD: 6.1 %
HCT VFR BLD AUTO: 45.3 % (ref 34–46.6)
HDLC SERPL-MCNC: 56 MG/DL (ref 40–60)
HGB BLD-MCNC: 15.6 G/DL (ref 12–15.9)
LDLC SERPL CALC-MCNC: 64 MG/DL (ref 0–100)
LDLC/HDLC SERPL: 1.09 {RATIO}
Lab: ABNORMAL
MCH RBC QN AUTO: 31.1 PG (ref 26.6–33)
MCHC RBC AUTO-ENTMCNC: 34.4 G/DL (ref 31.5–35.7)
MCV RBC AUTO: 90.2 FL (ref 79–97)
PLATELET # BLD AUTO: 219 10*3/MM3 (ref 140–450)
PMV BLD AUTO: 12 FL (ref 6–12)
POC CREATININE URINE: 10
POC MICROALBUMIN URINE: 10
POTASSIUM SERPL-SCNC: 4.1 MMOL/L (ref 3.5–5.2)
PROT SERPL-MCNC: 7.7 G/DL (ref 6–8.5)
RBC # BLD AUTO: 5.02 10*6/MM3 (ref 3.77–5.28)
SODIUM SERPL-SCNC: 137 MMOL/L (ref 136–145)
TRIGL SERPL-MCNC: 131 MG/DL (ref 0–150)
TSH SERPL DL<=0.05 MIU/L-ACNC: 3.25 UIU/ML (ref 0.27–4.2)
VIT B12 BLD-MCNC: 701 PG/ML (ref 211–946)
VLDLC SERPL-MCNC: 23 MG/DL (ref 5–40)
WBC NRBC COR # BLD: 6.39 10*3/MM3 (ref 3.4–10.8)

## 2022-05-11 PROCEDURE — 1126F AMNT PAIN NOTED NONE PRSNT: CPT | Performed by: INTERNAL MEDICINE

## 2022-05-11 PROCEDURE — 80053 COMPREHEN METABOLIC PANEL: CPT

## 2022-05-11 PROCEDURE — 82607 VITAMIN B-12: CPT

## 2022-05-11 PROCEDURE — 36415 COLL VENOUS BLD VENIPUNCTURE: CPT

## 2022-05-11 PROCEDURE — 1170F FXNL STATUS ASSESSED: CPT | Performed by: INTERNAL MEDICINE

## 2022-05-11 PROCEDURE — 85027 COMPLETE CBC AUTOMATED: CPT

## 2022-05-11 PROCEDURE — 99397 PER PM REEVAL EST PAT 65+ YR: CPT | Performed by: INTERNAL MEDICINE

## 2022-05-11 PROCEDURE — G0439 PPPS, SUBSEQ VISIT: HCPCS | Performed by: INTERNAL MEDICINE

## 2022-05-11 PROCEDURE — 83036 HEMOGLOBIN GLYCOSYLATED A1C: CPT | Performed by: INTERNAL MEDICINE

## 2022-05-11 PROCEDURE — 1160F RVW MEDS BY RX/DR IN RCRD: CPT | Performed by: INTERNAL MEDICINE

## 2022-05-11 PROCEDURE — 82044 UR ALBUMIN SEMIQUANTITATIVE: CPT | Performed by: INTERNAL MEDICINE

## 2022-05-11 PROCEDURE — 82306 VITAMIN D 25 HYDROXY: CPT

## 2022-05-11 PROCEDURE — 84443 ASSAY THYROID STIM HORMONE: CPT

## 2022-05-11 PROCEDURE — 80061 LIPID PANEL: CPT

## 2022-05-11 PROCEDURE — G0444 DEPRESSION SCREEN ANNUAL: HCPCS | Performed by: INTERNAL MEDICINE

## 2022-05-11 PROCEDURE — 3044F HG A1C LEVEL LT 7.0%: CPT | Performed by: INTERNAL MEDICINE

## 2022-05-11 RX ORDER — METOPROLOL SUCCINATE 100 MG/1
100 TABLET, EXTENDED RELEASE ORAL DAILY
Qty: 90 TABLET | Refills: 3 | Status: SHIPPED | OUTPATIENT
Start: 2022-05-11

## 2022-05-11 RX ORDER — PANTOPRAZOLE SODIUM 40 MG/1
1 TABLET, DELAYED RELEASE ORAL DAILY
COMMUNITY
Start: 2022-03-31

## 2022-05-11 RX ORDER — AMLODIPINE AND OLMESARTAN MEDOXOMIL 10; 40 MG/1; MG/1
1 TABLET ORAL DAILY
Qty: 90 TABLET | Refills: 3 | Status: SHIPPED | OUTPATIENT
Start: 2022-05-11

## 2022-05-11 RX ORDER — METFORMIN HYDROCHLORIDE 500 MG/1
500 TABLET, EXTENDED RELEASE ORAL
Qty: 90 TABLET | Refills: 3 | Status: SHIPPED | OUTPATIENT
Start: 2022-05-11

## 2022-05-11 RX ORDER — PRAVASTATIN SODIUM 40 MG
40 TABLET ORAL DAILY
Qty: 90 TABLET | Refills: 3 | Status: SHIPPED | OUTPATIENT
Start: 2022-05-11

## 2022-05-11 NOTE — PROGRESS NOTES
The ABCs of the Annual Wellness Visit  Subsequent Medicare Wellness Visit    Chief Complaint   Patient presents with   • Medicare Wellness-subsequent      Subjective    History of Present Illness:  Claire Lux is a 71 y.o. female who presents for a Subsequent Medicare Wellness Visit.    The following portions of the patient's history were reviewed and   updated as appropriate: allergies, current medications, past family history, past medical history, past social history, past surgical history and problem list.    Compared to one year ago, the patient feels her physical   health is the same.    Compared to one year ago, the patient feels her mental   health is the same.    Recent Hospitalizations:  She was not admitted to the hospital during the last year.       Current Medical Providers:  Patient Care Team:  Rosemarie Marley MD as PCP - General    Outpatient Medications Prior to Visit   Medication Sig Dispense Refill   • Cholecalciferol (VITAMIN D) 50 MCG (2000 UT) capsule Take 1 capsule by mouth Daily.     • glucose blood test strip Accu-Chek Pricila Plus In Vitro Strip; Patient Sig: Accu-Chek Pricila Plus In Vitro Strip USE TO TEST DAILY; 1; 0; 09-Nov-2012; Active     • Multiple Vitamins-Minerals (MULTI VITAMIN/MINERALS PO) Take 1 tablet by mouth daily.     • niacin 500 MG tablet Take 2 tablets by mouth 2 (two) times a day.     • Omega-3 Fatty Acids (EQL FISH OIL) 1000 MG capsule Take  by mouth daily.     • pantoprazole (PROTONIX) 40 MG EC tablet Take 1 tablet by mouth Daily.     • psyllium (METAMUCIL) 0.52 G capsule Take 3 capsules by mouth 2 (two) times a day.     • ursodiol (ACTIGALL) 300 MG capsule Take 2 capsules by mouth 2 (two) times a day.     • Vitamin E 400 UNITS tablet Take 1 tablet by mouth daily.     • amlodipine-olmesartan (JENNI) 10-40 MG per tablet TAKE 1 TABLET DAILY FOR BLOOD PRESSURE 90 tablet 0   • metFORMIN ER (GLUCOPHAGE-XR) 500 MG 24 hr tablet TAKE 1 TABLET DAILY WITH BREAKFAST 90 tablet 0  "  • metoprolol succinate XL (TOPROL-XL) 100 MG 24 hr tablet TAKE 1 TABLET DAILY 90 tablet 1   • pravastatin (PRAVACHOL) 40 MG tablet TAKE 1 TABLET DAILY 90 tablet 1     No facility-administered medications prior to visit.       No opioid medication identified on active medication list. I have reviewed chart for other potential  high risk medication/s and harmful drug interactions in the elderly.          Aspirin is not on active medication list.  Aspirin use is not indicated based on review of current medical condition/s. Risk of harm outweighs potential benefits.  .    Patient Active Problem List   Diagnosis   • Abnormal weight gain   • Benign essential hypertension   • Hypercalcemia   • Hyperlipidemia   • Nonalcoholic fatty liver disease   • Type 2 diabetes mellitus (HCC)   • Postmenopausal disorder     Advance Care Planning  Advance Directive is on file.  ACP discussion was held with the patient during this visit. Patient has an advance directive in EMR which is still valid.     Review of Systems   Constitutional: Negative for chills and fever.   HENT: Negative for congestion, ear pain and sore throat.    Eyes: Negative for pain, redness and visual disturbance.   Respiratory: Negative for cough and shortness of breath.    Cardiovascular: Negative for chest pain, palpitations and leg swelling.   Gastrointestinal: Negative for abdominal pain, diarrhea and nausea.   Endocrine: Negative for cold intolerance and heat intolerance.   Genitourinary: Negative for flank pain and urgency.   Musculoskeletal: Negative for arthralgias and gait problem.   Skin: Negative for pallor and rash.   Neurological: Negative for dizziness and weakness.   Psychiatric/Behavioral: Negative for dysphoric mood and sleep disturbance. The patient is not nervous/anxious.         Objective    Vitals:    05/11/22 0837   BP: 122/80   Pulse: 86   SpO2: 97%  Comment: ra   Weight: 90.8 kg (200 lb 3.2 oz)   Height: 152.4 cm (60\")   PainSc: 0-No pain "     BMI Readings from Last 1 Encounters:   05/11/22 39.10 kg/m²   BMI is above normal parameters. Recommendations include: exercise counseling and nutrition counseling    Does the patient have evidence of cognitive impairment? No    Physical Exam  Constitutional:       General: She is not in acute distress.     Appearance: Normal appearance.   HENT:      Head: Normocephalic and atraumatic.      Right Ear: Tympanic membrane and external ear normal.      Left Ear: Tympanic membrane and external ear normal.      Nose: Nose normal.      Mouth/Throat:      Mouth: Mucous membranes are moist.   Eyes:      General: No scleral icterus.  Neck:      Vascular: No carotid bruit.   Cardiovascular:      Rate and Rhythm: Normal rate and regular rhythm.      Pulses: Normal pulses.      Heart sounds: Normal heart sounds. No murmur heard.    No friction rub. No gallop.   Pulmonary:      Effort: Pulmonary effort is normal.      Breath sounds: Normal breath sounds. No rhonchi or rales.   Abdominal:      General: Bowel sounds are normal. There is no distension.      Palpations: Abdomen is soft.      Tenderness: There is no right CVA tenderness, left CVA tenderness, guarding or rebound.      Hernia: No hernia is present.   Musculoskeletal:         General: No tenderness. Normal range of motion.      Cervical back: Normal range of motion.      Right lower leg: No edema.      Left lower leg: No edema.   Lymphadenopathy:      Cervical: No cervical adenopathy.   Skin:     General: Skin is warm.      Findings: No rash.   Neurological:      General: No focal deficit present.      Mental Status: She is alert and oriented to person, place, and time. Mental status is at baseline.      Cranial Nerves: No cranial nerve deficit.      Sensory: No sensory deficit.      Coordination: Coordination normal.      Gait: Gait normal.      Deep Tendon Reflexes: Reflexes normal.   Psychiatric:         Mood and Affect: Mood normal.         Behavior: Behavior  normal.       Lab Results   Component Value Date    TRIG 131 05/11/2022    HDL 56 05/11/2022    LDL 64 05/11/2022    VLDL 23 05/11/2022    HGBA1C 6.1 05/11/2022            HEALTH RISK ASSESSMENT    Smoking Status:  Social History     Tobacco Use   Smoking Status Never Smoker   Smokeless Tobacco Never Used     Alcohol Consumption:  Social History     Substance and Sexual Activity   Alcohol Use No     Fall Risk Screen:    ANNAADI Fall Risk Assessment was completed, and patient is at LOW risk for falls.Assessment completed on:5/11/2022    Depression Screening:  PHQ-2/PHQ-9 Depression Screening 5/11/2022   Retired Total Score -   Little Interest or Pleasure in Doing Things 0-->not at all   Feeling Down, Depressed or Hopeless 0-->not at all   PHQ-9: Brief Depression Severity Measure Score 0       Health Habits and Functional and Cognitive Screening:  Functional & Cognitive Status 5/11/2022   Do you have difficulty preparing food and eating? No   Do you have difficulty bathing yourself, getting dressed or grooming yourself? No   Do you have difficulty using the toilet? No   Do you have difficulty moving around from place to place? No   Do you have trouble with steps or getting out of a bed or a chair? No   Current Diet Unhealthy Diet   Dental Exam Not up to date   Eye Exam Up to date   Exercise (times per week) 7 times per week   Current Exercises Include Walking;Yard Work   Current Exercise Activities Include -   Do you need help using the phone?  No   Are you deaf or do you have serious difficulty hearing?  No   Do you need help with transportation? No   Do you need help shopping? No   Do you need help preparing meals?  No   Do you need help with housework?  No   Do you need help with laundry? No   Do you need help taking your medications? No   Do you need help managing money? No   Do you ever drive or ride in a car without wearing a seat belt? No   Have you felt unusual stress, anger or loneliness in the last month? No    Who do you live with? Alone   If you need help, do you have trouble finding someone available to you? No   Have you been bothered in the last four weeks by sexual problems? No   Do you have difficulty concentrating, remembering or making decisions? No       Age-appropriate Screening Schedule:  Refer to the list below for future screening recommendations based on patient's age, sex and/or medical conditions. Orders for these recommended tests are listed in the plan section. The patient has been provided with a written plan.    Health Maintenance   Topic Date Due   • ZOSTER VACCINE (2 of 2) 11/16/2017   • INFLUENZA VACCINE  08/01/2022   • HEMOGLOBIN A1C  11/11/2022   • MAMMOGRAM  01/12/2023   • DIABETIC EYE EXAM  03/18/2023   • DIABETIC FOOT EXAM  05/11/2023   • LIPID PANEL  05/11/2023   • URINE MICROALBUMIN  05/11/2023   • DXA SCAN  01/12/2024   • TDAP/TD VACCINES  Discontinued              Assessment & Plan   CMS Preventative Services Quick Reference  Risk Factors Identified During Encounter  Cardiovascular Disease  Dementia/Memory   Fall Risk-High or Moderate  Immunizations Discussed/Encouraged (specific Immunizations; Shingrix and prevnar 20 11/2026  Inactivity/Sedentary  Obesity/Overweight   The above risks/problems have been discussed with the patient.  Follow up actions/plans if indicated are seen below in the Assessment/Plan Section.  Pertinent information has been shared with the patient in the After Visit Summary.    Medicare Wellness-subsequent    Subjective   Claire Lux is a 71 y.o. female is here today for follow-up.  HPI  Here for a f/u on her dm2, htn, hlp and SMALLS. S/p recent EGD and aborted ERCP by Dr. Mccall.    Current Outpatient Medications:   •  amlodipine-olmesartan (JENNI) 10-40 MG per tablet, Take 1 tablet by mouth Daily. for blood pressure, Disp: 90 tablet, Rfl: 3  •  Cholecalciferol (VITAMIN D) 50 MCG (2000 UT) capsule, Take 1 capsule by mouth Daily., Disp: , Rfl:   •  glucose blood  "test strip, Accu-Chek Pricila Plus In Vitro Strip; Patient Sig: Accu-Chek Pricila Plus In Vitro Strip USE TO TEST DAILY; 1; 0; 09-Nov-2012; Active, Disp: , Rfl:   •  metFORMIN ER (GLUCOPHAGE-XR) 500 MG 24 hr tablet, Take 1 tablet by mouth Daily With Breakfast., Disp: 90 tablet, Rfl: 3  •  metoprolol succinate XL (TOPROL-XL) 100 MG 24 hr tablet, Take 1 tablet by mouth Daily., Disp: 90 tablet, Rfl: 3  •  Multiple Vitamins-Minerals (MULTI VITAMIN/MINERALS PO), Take 1 tablet by mouth daily., Disp: , Rfl:   •  niacin 500 MG tablet, Take 2 tablets by mouth 2 (two) times a day., Disp: , Rfl:   •  Omega-3 Fatty Acids (EQL FISH OIL) 1000 MG capsule, Take  by mouth daily., Disp: , Rfl:   •  pantoprazole (PROTONIX) 40 MG EC tablet, Take 1 tablet by mouth Daily., Disp: , Rfl:   •  pravastatin (PRAVACHOL) 40 MG tablet, Take 1 tablet by mouth Daily., Disp: 90 tablet, Rfl: 3  •  psyllium (METAMUCIL) 0.52 G capsule, Take 3 capsules by mouth 2 (two) times a day., Disp: , Rfl:   •  ursodiol (ACTIGALL) 300 MG capsule, Take 2 capsules by mouth 2 (two) times a day., Disp: , Rfl:   •  Vitamin E 400 UNITS tablet, Take 1 tablet by mouth daily., Disp: , Rfl:       The following portions of the patient's history were reviewed and updated as appropriate: allergies, current medications, past family history, past medical history, past social history, past surgical history and problem list.        Objective   /80   Pulse 86   Ht 152.4 cm (60\")   Wt 90.8 kg (200 lb 3.2 oz)   SpO2 97% Comment: ra  BMI 39.10 kg/m²         Results for orders placed or performed in visit on 05/11/22   POC Glycosylated Hemoglobin (Hb A1C)    Specimen: Blood   Result Value Ref Range    Hemoglobin A1C 6.1 %    Lot Number [id number]     Expiration Date 01/17/24    POCT microalbumin    Specimen: Urine   Result Value Ref Range    Microalbumin, Urine 10     Creatinine, Urine 10              Assessment & Plan   Diagnoses and all orders for this visit:    Medicare " annual wellness visit, subsequent    Type 2 diabetes mellitus with other specified complication, without long-term current use of insulin (HCC)  -     POC Glycosylated Hemoglobin (Hb A1C)  -     POCT microalbumin  -     metFORMIN ER (GLUCOPHAGE-XR) 500 MG 24 hr tablet; Take 1 tablet by mouth Daily With Breakfast.    Benign essential hypertension  -     amlodipine-olmesartan (JENNI) 10-40 MG per tablet; Take 1 tablet by mouth Daily. for blood pressure  -     metoprolol succinate XL (TOPROL-XL) 100 MG 24 hr tablet; Take 1 tablet by mouth Daily.    Mixed hyperlipidemia  -     pravastatin (PRAVACHOL) 40 MG tablet; Take 1 tablet by mouth Daily.  -     Lipid Panel; Future  -     TSH; Future    Elevated hemoglobin (HCC)  -     CBC (No Diff); Future  -     Vitamin B12; Future    Nonalcoholic fatty liver disease  -     Comprehensive Metabolic Panel; Future  -     Lipid Panel; Future  -     TSH; Future    Vitamin D deficiency  -     Vitamin D 25 Hydroxy; Future  -     Vitamin B12; Future    Other orders  -     pantoprazole (PROTONIX) 40 MG EC tablet; Take 1 tablet by mouth Daily.               PHQ-2/PHQ-9 Depression screening reviewed with patient . Total time spent today for depression screening  with Claire Lux  was 15 minutes in counseling, along with plans for any diagnositc work-up and treatment.    Advice and education were given regarding cardiovascular risk reduction, healthy dietary habits, Seatbelt and helmet use and self skin examination.           Follow Up:   Return in about 6 months (around 11/11/2022) for Next scheduled follow up.     An After Visit Summary and PPPS were made available to the patient.                   Electronically signed by:    Rsoemarie Marley MD

## 2022-07-09 ENCOUNTER — TELEPHONE ENCOUNTER (OUTPATIENT)
Dept: URBAN - METROPOLITAN AREA CLINIC 121 | Facility: CLINIC | Age: 72
End: 2022-07-09

## 2022-07-09 RX ORDER — VIT A/VIT C/VIT E/ZINC/COPPER 2148-113
TABLET ORAL ONCE A DAY
Refills: 0 | OUTPATIENT
Start: 2016-08-22 | End: 2016-10-10

## 2022-07-09 RX ORDER — BLOOD SUGAR DIAGNOSTIC
ONCE MONTHLY STRIP MISCELLANEOUS
Refills: 0 | OUTPATIENT
Start: 2016-08-22 | End: 2016-10-10

## 2022-07-09 RX ORDER — VIT A/VIT C/VIT E/ZINC/COPPER 2148-113
TABLET ORAL ONCE A DAY
Refills: 0 | OUTPATIENT
Start: 2016-10-10 | End: 2016-12-14

## 2022-07-09 RX ORDER — BLOOD SUGAR DIAGNOSTIC
ONCE MONTHLY STRIP MISCELLANEOUS
Refills: 0 | OUTPATIENT
Start: 2016-10-10 | End: 2016-12-14

## 2022-07-09 RX ORDER — CLOTRIMAZOLE 1 %
CREAM (GRAM) TOPICAL ONCE A DAY
Refills: 0 | OUTPATIENT
Start: 2016-08-22 | End: 2016-10-10

## 2022-07-09 RX ORDER — CLOTRIMAZOLE 1 %
CREAM (GRAM) TOPICAL ONCE A DAY
Refills: 0 | OUTPATIENT
Start: 2016-10-10 | End: 2016-12-14

## 2022-07-09 RX ORDER — BLOOD SUGAR DIAGNOSTIC
STRIP MISCELLANEOUS
Refills: 0 | OUTPATIENT
Start: 2016-06-21 | End: 2016-08-22

## 2022-07-10 ENCOUNTER — TELEPHONE ENCOUNTER (OUTPATIENT)
Dept: URBAN - METROPOLITAN AREA CLINIC 121 | Facility: CLINIC | Age: 72
End: 2022-07-10

## 2022-07-10 RX ORDER — CLOTRIMAZOLE 1 %
CREAM (GRAM) TOPICAL ONCE A DAY
Refills: 0 | Status: ACTIVE | COMMUNITY
Start: 2016-12-14

## 2022-07-10 RX ORDER — VIT A/VIT C/VIT E/ZINC/COPPER 2148-113
TABLET ORAL ONCE A DAY
Refills: 0 | Status: ACTIVE | COMMUNITY
Start: 2016-12-14

## 2022-07-10 RX ORDER — BLOOD SUGAR DIAGNOSTIC
ONCE MONTHLY STRIP MISCELLANEOUS
Refills: 0 | Status: ACTIVE | COMMUNITY
Start: 2016-12-14

## 2022-11-09 ENCOUNTER — OFFICE VISIT (OUTPATIENT)
Dept: INTERNAL MEDICINE | Facility: CLINIC | Age: 72
End: 2022-11-09

## 2022-11-09 ENCOUNTER — LAB (OUTPATIENT)
Dept: LAB | Facility: HOSPITAL | Age: 72
End: 2022-11-09

## 2022-11-09 VITALS
WEIGHT: 191.4 LBS | HEIGHT: 60 IN | SYSTOLIC BLOOD PRESSURE: 128 MMHG | TEMPERATURE: 97.5 F | OXYGEN SATURATION: 97 % | HEART RATE: 94 BPM | DIASTOLIC BLOOD PRESSURE: 70 MMHG | BODY MASS INDEX: 37.58 KG/M2

## 2022-11-09 DIAGNOSIS — K76.0 NONALCOHOLIC FATTY LIVER DISEASE: ICD-10-CM

## 2022-11-09 DIAGNOSIS — E78.2 MIXED HYPERLIPIDEMIA: ICD-10-CM

## 2022-11-09 DIAGNOSIS — E11.69 TYPE 2 DIABETES MELLITUS WITH OTHER SPECIFIED COMPLICATION, WITHOUT LONG-TERM CURRENT USE OF INSULIN: Primary | ICD-10-CM

## 2022-11-09 LAB
ALBUMIN SERPL-MCNC: 4.6 G/DL (ref 3.5–5.2)
ALBUMIN/GLOB SERPL: 1.4 G/DL
ALP SERPL-CCNC: 103 U/L (ref 39–117)
ALT SERPL W P-5'-P-CCNC: 40 U/L (ref 1–33)
ANION GAP SERPL CALCULATED.3IONS-SCNC: 13.9 MMOL/L (ref 5–15)
AST SERPL-CCNC: 73 U/L (ref 1–32)
BILIRUB SERPL-MCNC: 0.7 MG/DL (ref 0–1.2)
BUN SERPL-MCNC: 10 MG/DL (ref 8–23)
BUN/CREAT SERPL: 12.3 (ref 7–25)
CALCIUM SPEC-SCNC: 10.1 MG/DL (ref 8.6–10.5)
CHLORIDE SERPL-SCNC: 101 MMOL/L (ref 98–107)
CHOLEST SERPL-MCNC: 144 MG/DL (ref 0–200)
CO2 SERPL-SCNC: 26.1 MMOL/L (ref 22–29)
CREAT SERPL-MCNC: 0.81 MG/DL (ref 0.57–1)
EGFRCR SERPLBLD CKD-EPI 2021: 76.3 ML/MIN/1.73
EXPIRATION DATE: NORMAL
GLOBULIN UR ELPH-MCNC: 3.2 GM/DL
GLUCOSE SERPL-MCNC: 116 MG/DL (ref 65–99)
HBA1C MFR BLD: 6 %
HDLC SERPL-MCNC: 54 MG/DL (ref 40–60)
LDLC SERPL CALC-MCNC: 67 MG/DL (ref 0–100)
LDLC/HDLC SERPL: 1.19 {RATIO}
Lab: NORMAL
POTASSIUM SERPL-SCNC: 4.3 MMOL/L (ref 3.5–5.2)
PROT SERPL-MCNC: 7.8 G/DL (ref 6–8.5)
SODIUM SERPL-SCNC: 141 MMOL/L (ref 136–145)
TRIGL SERPL-MCNC: 129 MG/DL (ref 0–150)
VLDLC SERPL-MCNC: 23 MG/DL (ref 5–40)

## 2022-11-09 PROCEDURE — 99214 OFFICE O/P EST MOD 30 MIN: CPT | Performed by: INTERNAL MEDICINE

## 2022-11-09 PROCEDURE — 83036 HEMOGLOBIN GLYCOSYLATED A1C: CPT | Performed by: INTERNAL MEDICINE

## 2022-11-09 PROCEDURE — 36415 COLL VENOUS BLD VENIPUNCTURE: CPT

## 2022-11-09 PROCEDURE — 82105 ALPHA-FETOPROTEIN SERUM: CPT

## 2022-11-09 PROCEDURE — 3044F HG A1C LEVEL LT 7.0%: CPT | Performed by: INTERNAL MEDICINE

## 2022-11-09 PROCEDURE — 80053 COMPREHEN METABOLIC PANEL: CPT

## 2022-11-09 PROCEDURE — 80061 LIPID PANEL: CPT

## 2022-11-09 NOTE — PROGRESS NOTES
Hypertension and Diabetes    Subjective   Claire Lux is a 72 y.o. female is here today for follow-up.    History of Present Illness     She states that she is doing fine. She reports that she had laboratory testing done in 06/2022, and this was ordered by Dr. Mccall, and she would like to repeat the test again. The patient has a scheduled visit with Dr. Mccall in 12/2022. She is fasting today. Since her last visit, she is down 10 pounds. She states that normal yard work contributes to her weight loss. Her brother lives with her and helps with yard work. She denies having any tenderness.     She received the Moderna COVID-19 booster and the influenza vaccine at [facility] on 10/05/2022.    Her hemoglobin A1c is at 6.0 percent. The patient is up-to-date on current medications.    Her blood pressure was elevated when she first came in, but it has reduced to 128/70 mmHg upon recheck.    Answers for HPI/ROS submitted by the patient on 11/2/2022  What is the primary reason for your visit?: Diabetes        Current Outpatient Medications:   •  amlodipine-olmesartan (JENNI) 10-40 MG per tablet, Take 1 tablet by mouth Daily. for blood pressure, Disp: 90 tablet, Rfl: 3  •  Cholecalciferol (VITAMIN D) 50 MCG (2000 UT) capsule, Take 1 capsule by mouth Daily., Disp: , Rfl:   •  glucose blood test strip, Accu-Chek Pricila Plus In Vitro Strip; Patient Sig: Accu-Chek Pricila Plus In Vitro Strip USE TO TEST DAILY; 1; 0; 09-Nov-2012; Active, Disp: , Rfl:   •  metFORMIN ER (GLUCOPHAGE-XR) 500 MG 24 hr tablet, Take 1 tablet by mouth Daily With Breakfast., Disp: 90 tablet, Rfl: 3  •  metoprolol succinate XL (TOPROL-XL) 100 MG 24 hr tablet, Take 1 tablet by mouth Daily., Disp: 90 tablet, Rfl: 3  •  Multiple Vitamins-Minerals (MULTI VITAMIN/MINERALS PO), Take 1 tablet by mouth daily., Disp: , Rfl:   •  niacin 500 MG tablet, Take 2 tablets by mouth 2 (two) times a day., Disp: , Rfl:   •  Omega-3 Fatty Acids (EQL FISH OIL) 1000 MG capsule, Take   "by mouth daily., Disp: , Rfl:   •  pantoprazole (PROTONIX) 40 MG EC tablet, Take 1 tablet by mouth Daily., Disp: , Rfl:   •  pravastatin (PRAVACHOL) 40 MG tablet, Take 1 tablet by mouth Daily., Disp: 90 tablet, Rfl: 3  •  psyllium (METAMUCIL) 0.52 G capsule, Take 3 capsules by mouth 2 (two) times a day., Disp: , Rfl:   •  ursodiol (ACTIGALL) 300 MG capsule, Take 2 capsules by mouth 2 (two) times a day., Disp: , Rfl:   •  Vitamin E 400 UNITS tablet, Take 1 tablet by mouth daily., Disp: , Rfl:       The following portions of the patient's history were reviewed and updated as appropriate: allergies, current medications, past family history, past medical history, past social history, past surgical history and problem list.    Review of Systems   Constitutional: Negative.  Negative for chills, fatigue and fever.   HENT: Negative for ear discharge, ear pain, sinus pressure and sore throat.    Respiratory: Negative for cough, chest tightness and shortness of breath.    Cardiovascular: Negative for chest pain, palpitations and leg swelling.   Gastrointestinal: Negative for diarrhea, nausea and vomiting.   Endocrine: Negative for polydipsia, polyphagia and polyuria.   Musculoskeletal: Negative for arthralgias, back pain and myalgias.   Skin: Negative for pallor.   Neurological: Negative for dizziness, tremors, seizures, syncope, speech difficulty, weakness and headaches.   Psychiatric/Behavioral: Negative for confusion and sleep disturbance. The patient is not nervous/anxious.        Objective   /70   Pulse 94   Temp 97.5 °F (36.4 °C)   Ht 152.4 cm (60\")   Wt 86.8 kg (191 lb 6.4 oz)   SpO2 97% Comment: ra  BMI 37.38 kg/m²   Physical Exam  Vitals and nursing note reviewed.   Constitutional:       Appearance: She is well-developed.   HENT:      Head: Normocephalic and atraumatic.      Right Ear: External ear normal.      Left Ear: External ear normal.      Mouth/Throat:      Pharynx: No oropharyngeal exudate. "   Eyes:      Conjunctiva/sclera: Conjunctivae normal.      Pupils: Pupils are equal, round, and reactive to light.   Neck:      Thyroid: No thyromegaly.   Cardiovascular:      Rate and Rhythm: Normal rate and regular rhythm.      Pulses: Normal pulses.      Heart sounds: Normal heart sounds. No murmur heard.    No friction rub. No gallop.   Pulmonary:      Effort: Pulmonary effort is normal.      Breath sounds: Normal breath sounds.   Abdominal:      General: Bowel sounds are normal. There is no distension.      Palpations: Abdomen is soft.      Tenderness: There is no abdominal tenderness.   Musculoskeletal:      Cervical back: Neck supple.   Skin:     General: Skin is warm and dry.   Neurological:      Mental Status: She is alert and oriented to person, place, and time.      Cranial Nerves: No cranial nerve deficit.   Psychiatric:         Judgment: Judgment normal.           Results for orders placed or performed in visit on 11/09/22   POC Glycosylated Hemoglobin (Hb A1C)    Specimen: Blood   Result Value Ref Range    Hemoglobin A1C 6.0 %    Lot Number [id number]     Expiration Date 09/07/2024              Assessment & Plan   Diagnoses and all orders for this visit:    Type 2 diabetes mellitus with other specified complication, without long-term current use of insulin (HCC)  -     POC Glycosylated Hemoglobin (Hb A1C)    Mixed hyperlipidemia  -     Comprehensive Metabolic Panel; Future  -     Lipid Panel; Future  -     AFP Tumor Marker; Future    Nonalcoholic fatty liver disease  -     Comprehensive Metabolic Panel; Future    prior AFP-4.2 in [place] db 4.8 in June 2022      1. Type 2 diabetes mellitus.  - Continue current regimen.     2. Mixed hyperlipidemia.  - She will have labs completed today.         Return in about 6 months (around 5/9/2023) for Medicare Wellness.     Transcribed from ambient dictation for Rosemarie Marley MD by Rl Pagan.  11/09/22   15:38 EST    Patient or patient representative  verbalized consent to the visit recording.  I have personally performed the services described in this document as transcribed by the above individual, and it is both accurate and complete.  Rosemarie Marley MD  11/12/2022  00:33 EST      Electronically signed by:    Rosemarie Marley MD

## 2022-11-10 LAB — ALPHA-FETOPROTEIN: 4.19 NG/ML (ref 0–8.3)

## 2023-05-15 ENCOUNTER — TRANSCRIBE ORDERS (OUTPATIENT)
Dept: ADMINISTRATIVE | Facility: HOSPITAL | Age: 73
End: 2023-05-15
Payer: MEDICARE

## 2023-05-15 DIAGNOSIS — Z12.31 VISIT FOR SCREENING MAMMOGRAM: Primary | ICD-10-CM

## 2023-05-17 ENCOUNTER — OFFICE VISIT (OUTPATIENT)
Dept: INTERNAL MEDICINE | Facility: CLINIC | Age: 73
End: 2023-05-17
Payer: MEDICARE

## 2023-05-17 ENCOUNTER — LAB (OUTPATIENT)
Dept: LAB | Facility: HOSPITAL | Age: 73
End: 2023-05-17
Payer: MEDICARE

## 2023-05-17 VITALS
OXYGEN SATURATION: 99 % | WEIGHT: 184 LBS | HEART RATE: 80 BPM | BODY MASS INDEX: 36.12 KG/M2 | SYSTOLIC BLOOD PRESSURE: 122 MMHG | HEIGHT: 60 IN | DIASTOLIC BLOOD PRESSURE: 70 MMHG | TEMPERATURE: 97.7 F

## 2023-05-17 DIAGNOSIS — I10 BENIGN ESSENTIAL HYPERTENSION: ICD-10-CM

## 2023-05-17 DIAGNOSIS — K76.0 NONALCOHOLIC FATTY LIVER DISEASE: ICD-10-CM

## 2023-05-17 DIAGNOSIS — R05.3 CHRONIC COUGH: ICD-10-CM

## 2023-05-17 DIAGNOSIS — R97.8 OTHER ABNORMAL TUMOR MARKERS: ICD-10-CM

## 2023-05-17 DIAGNOSIS — Z00.00 MEDICARE ANNUAL WELLNESS VISIT, SUBSEQUENT: Primary | ICD-10-CM

## 2023-05-17 DIAGNOSIS — E11.69 TYPE 2 DIABETES MELLITUS WITH OTHER SPECIFIED COMPLICATION, WITHOUT LONG-TERM CURRENT USE OF INSULIN: ICD-10-CM

## 2023-05-17 DIAGNOSIS — R63.4 UNINTENTIONAL WEIGHT LOSS: ICD-10-CM

## 2023-05-17 DIAGNOSIS — E78.2 MIXED HYPERLIPIDEMIA: ICD-10-CM

## 2023-05-17 DIAGNOSIS — E55.9 VITAMIN D DEFICIENCY: ICD-10-CM

## 2023-05-17 DIAGNOSIS — R06.00 DYSPNEA, UNSPECIFIED TYPE: ICD-10-CM

## 2023-05-17 LAB
25(OH)D3 SERPL-MCNC: 99.2 NG/ML (ref 30–100)
ALBUMIN SERPL-MCNC: 4.5 G/DL (ref 3.5–5.2)
ALBUMIN/GLOB SERPL: 1.4 G/DL
ALP SERPL-CCNC: 109 U/L (ref 39–117)
ALPHA-FETOPROTEIN: 3.86 NG/ML (ref 0–8.3)
ALT SERPL W P-5'-P-CCNC: 35 U/L (ref 1–33)
ANION GAP SERPL CALCULATED.3IONS-SCNC: 13.1 MMOL/L (ref 5–15)
AST SERPL-CCNC: 58 U/L (ref 1–32)
BILIRUB SERPL-MCNC: 0.9 MG/DL (ref 0–1.2)
BUN SERPL-MCNC: 10 MG/DL (ref 8–23)
BUN/CREAT SERPL: 13 (ref 7–25)
CALCIUM SPEC-SCNC: 10.2 MG/DL (ref 8.6–10.5)
CHLORIDE SERPL-SCNC: 101 MMOL/L (ref 98–107)
CHOLEST SERPL-MCNC: 128 MG/DL (ref 0–200)
CO2 SERPL-SCNC: 23.9 MMOL/L (ref 22–29)
CREAT SERPL-MCNC: 0.77 MG/DL (ref 0.57–1)
DEPRECATED RDW RBC AUTO: 43.1 FL (ref 37–54)
EGFRCR SERPLBLD CKD-EPI 2021: 81.1 ML/MIN/1.73
ERYTHROCYTE [DISTWIDTH] IN BLOOD BY AUTOMATED COUNT: 13.2 % (ref 12.3–15.4)
ERYTHROCYTE [SEDIMENTATION RATE] IN BLOOD: 34 MM/HR (ref 0–30)
EXPIRATION DATE: ABNORMAL
EXPIRATION DATE: NORMAL
GLOBULIN UR ELPH-MCNC: 3.2 GM/DL
GLUCOSE SERPL-MCNC: 136 MG/DL (ref 65–99)
HBA1C MFR BLD: 5.9 %
HCT VFR BLD AUTO: 44.8 % (ref 34–46.6)
HDLC SERPL-MCNC: 48 MG/DL (ref 40–60)
HGB BLD-MCNC: 15.1 G/DL (ref 12–15.9)
INR PPP: 1.17 (ref 0.89–1.12)
LDLC SERPL CALC-MCNC: 53 MG/DL (ref 0–100)
LDLC/HDLC SERPL: 1.01 {RATIO}
Lab: ABNORMAL
Lab: NORMAL
MCH RBC QN AUTO: 30.4 PG (ref 26.6–33)
MCHC RBC AUTO-ENTMCNC: 33.7 G/DL (ref 31.5–35.7)
MCV RBC AUTO: 90.3 FL (ref 79–97)
NT-PROBNP SERPL-MCNC: 171 PG/ML (ref 0–900)
PLATELET # BLD AUTO: 191 10*3/MM3 (ref 140–450)
PMV BLD AUTO: 12 FL (ref 6–12)
POC CREATININE URINE: 10
POC MICROALBUMIN URINE: 10
POTASSIUM SERPL-SCNC: 4.4 MMOL/L (ref 3.5–5.2)
PROT SERPL-MCNC: 7.7 G/DL (ref 6–8.5)
PROTHROMBIN TIME: 15.1 SECONDS (ref 12.2–14.5)
RBC # BLD AUTO: 4.96 10*6/MM3 (ref 3.77–5.28)
SODIUM SERPL-SCNC: 138 MMOL/L (ref 136–145)
TRIGL SERPL-MCNC: 158 MG/DL (ref 0–150)
TSH SERPL DL<=0.05 MIU/L-ACNC: 3.7 UIU/ML (ref 0.27–4.2)
VIT B12 BLD-MCNC: 812 PG/ML (ref 211–946)
VLDLC SERPL-MCNC: 27 MG/DL (ref 5–40)
WBC NRBC COR # BLD: 6.86 10*3/MM3 (ref 3.4–10.8)

## 2023-05-17 PROCEDURE — 36415 COLL VENOUS BLD VENIPUNCTURE: CPT

## 2023-05-17 PROCEDURE — 80053 COMPREHEN METABOLIC PANEL: CPT

## 2023-05-17 PROCEDURE — 85610 PROTHROMBIN TIME: CPT

## 2023-05-17 PROCEDURE — 82105 ALPHA-FETOPROTEIN SERUM: CPT

## 2023-05-17 PROCEDURE — 85652 RBC SED RATE AUTOMATED: CPT

## 2023-05-17 PROCEDURE — 82306 VITAMIN D 25 HYDROXY: CPT

## 2023-05-17 PROCEDURE — 80061 LIPID PANEL: CPT

## 2023-05-17 PROCEDURE — 85027 COMPLETE CBC AUTOMATED: CPT

## 2023-05-17 PROCEDURE — 83880 ASSAY OF NATRIURETIC PEPTIDE: CPT | Performed by: INTERNAL MEDICINE

## 2023-05-17 PROCEDURE — 82607 VITAMIN B-12: CPT

## 2023-05-17 PROCEDURE — 84443 ASSAY THYROID STIM HORMONE: CPT

## 2023-05-17 RX ORDER — AMLODIPINE AND OLMESARTAN MEDOXOMIL 10; 40 MG/1; MG/1
1 TABLET ORAL DAILY
Qty: 90 TABLET | Refills: 3 | Status: SHIPPED | OUTPATIENT
Start: 2023-05-17

## 2023-05-17 RX ORDER — METFORMIN HYDROCHLORIDE 500 MG/1
500 TABLET, EXTENDED RELEASE ORAL
Qty: 90 TABLET | Refills: 3 | Status: SHIPPED | OUTPATIENT
Start: 2023-05-17

## 2023-05-17 RX ORDER — PRAVASTATIN SODIUM 40 MG
40 TABLET ORAL DAILY
Qty: 90 TABLET | Refills: 3 | Status: SHIPPED | OUTPATIENT
Start: 2023-05-17

## 2023-05-17 RX ORDER — METOPROLOL SUCCINATE 100 MG/1
100 TABLET, EXTENDED RELEASE ORAL DAILY
Qty: 90 TABLET | Refills: 3 | Status: SHIPPED | OUTPATIENT
Start: 2023-05-17

## 2023-05-17 NOTE — PROGRESS NOTES
The ABCs of the Annual Wellness Visit  Subsequent Medicare Wellness Visit    Subjective    Claire Lux is a 72 y.o. female who presents for a Subsequent Medicare Wellness Visit.    Ms. Lux states she is doing well overall. She does not exercise regularly aside from going to [facility] and confirms taking aspirin 81 mg daily.    The patient has not received a shingles vaccine and has received the hepatitis a vaccine series. However, there is no record of hepatitis b vaccines on file.    Up to date with colonoscopy and scheduled for a mammogram on 06/15/2023. Her last eye exam was completed on 03/24/2023 with Dr. Rianna Christopher at the [facility] due to cataracts.     The patient is driven by others and states she has never driven her own vehicle. Currently using a personal ID for photo identification.     No history of smoking reported though she was exposed to secondhand smoke via both of her parents.     The following portions of the patient's history were reviewed and   updated as appropriate: allergies, current medications, past family history, past medical history, past social history, past surgical history and problem list.    Compared to one year ago, the patient feels her physical   health is the same.    Compared to one year ago, the patient feels her mental   health is the same.    Recent Hospitalizations:  She was not admitted to the hospital during the last year.       Current Medical Providers:  Patient Care Team:  Rosemarie Marley MD as PCP - General    Outpatient Medications Prior to Visit   Medication Sig Dispense Refill   • BABY ASPIRIN PO Take  by mouth.     • Cholecalciferol (VITAMIN D) 50 MCG (2000 UT) capsule Take 1 capsule by mouth Daily.     • glucose blood test strip Accu-Chek Pricila Plus In Vitro Strip; Patient Sig: Accu-Chek Pricila Plus In Vitro Strip USE TO TEST DAILY; 1; 0; 09-Nov-2012; Active     • Multiple Vitamins-Minerals (MULTI VITAMIN/MINERALS PO) Take 1 tablet by mouth  "Daily.     • niacin 500 MG tablet Take 2 tablets by mouth 2 (Two) Times a Day.     • Omega-3 Fatty Acids (EQL FISH OIL) 1000 MG capsule Take  by mouth daily.     • pantoprazole (PROTONIX) 40 MG EC tablet Take 1 tablet by mouth Daily.     • psyllium (METAMUCIL) 0.52 G capsule Take 3 capsules by mouth 2 (Two) Times a Day.     • ursodiol (ACTIGALL) 300 MG capsule Take 2 capsules by mouth 2 (Two) Times a Day.     • Vitamin E 400 UNITS tablet Take 1 tablet by mouth daily.     • amlodipine-olmesartan (JENNI) 10-40 MG per tablet Take 1 tablet by mouth Daily. for blood pressure 90 tablet 3   • metFORMIN ER (GLUCOPHAGE-XR) 500 MG 24 hr tablet Take 1 tablet by mouth Daily With Breakfast. 90 tablet 3   • metoprolol succinate XL (TOPROL-XL) 100 MG 24 hr tablet Take 1 tablet by mouth Daily. 90 tablet 3   • pravastatin (PRAVACHOL) 40 MG tablet Take 1 tablet by mouth Daily. 90 tablet 3     No facility-administered medications prior to visit.       No opioid medication identified on active medication list. I have reviewed chart for other potential  high risk medication/s and harmful drug interactions in the elderly.          Aspirin is not on active medication list.  taking a baby asa daily.    Patient Active Problem List   Diagnosis   • Abnormal weight gain   • Benign essential hypertension   • Hypercalcemia   • Hyperlipidemia   • Nonalcoholic fatty liver disease   • Type 2 diabetes mellitus   • Postmenopausal disorder     Advance Care Planning   Advance Care Planning     Advance Directive is on file.  ACP discussion was held with the patient during this visit. Patient has an advance directive in EMR which is still valid.      Objective    Vitals:    05/17/23 0835   BP: 122/70   Pulse: 80   Temp: 97.7 °F (36.5 °C)   SpO2: 99%  Comment: RA   Weight: 83.5 kg (184 lb)   Height: 152.4 cm (60\")   PainSc: 0-No pain     Estimated body mass index is 35.94 kg/m² as calculated from the following:    Height as of this encounter: 152.4 cm " "(60\").    Weight as of this encounter: 83.5 kg (184 lb).    Class 2 Severe Obesity (BMI >=35 and <=39.9). Obesity-related health conditions include the following: hypertension, diabetes mellitus and dyslipidemias. Obesity is improving with lifestyle modifications. BMI is is above average; BMI management plan is completed. We discussed portion control and increasing exercise.      Does the patient have evidence of cognitive impairment? No    Lab Results   Component Value Date    HGBA1C 5.9 2023        HEALTH RISK ASSESSMENT    Smoking Status:  Social History     Tobacco Use   Smoking Status Never   Smokeless Tobacco Never     Alcohol Consumption:  Social History     Substance and Sexual Activity   Alcohol Use No     Fall Risk Screen:    STEADI Fall Risk Assessment was completed, and patient is at LOW risk for falls.Assessment completed on:2023    Depression Screenin/17/2023     8:44 AM   PHQ-2/PHQ-9 Depression Screening   Little Interest or Pleasure in Doing Things 0-->not at all   Feeling Down, Depressed or Hopeless 0-->not at all   PHQ-9: Brief Depression Severity Measure Score 0       Health Habits and Functional and Cognitive Screenin/17/2023     8:43 AM   Functional & Cognitive Status   Do you have difficulty preparing food and eating? No   Do you have difficulty bathing yourself, getting dressed or grooming yourself? No   Do you have difficulty using the toilet? No   Do you have difficulty moving around from place to place? No   Do you have trouble with steps or getting out of a bed or a chair? No   Current Diet Well Balanced Diet   Dental Exam Up to date   Eye Exam Up to date   Exercise (times per week) 7 times per week   Current Exercises Include Yard Work;House Cleaning   Do you need help using the phone?  No   Are you deaf or do you have serious difficulty hearing?  No   Do you need help with transportation? No   Do you need help shopping? No   Do you need help preparing meals?  " No   Do you need help with housework?  No   Do you need help with laundry? No   Do you need help taking your medications? No   Do you need help managing money? No   Do you ever drive or ride in a car without wearing a seat belt? No   Have you felt unusual stress, anger or loneliness in the last month? No   Who do you live with? Sibling   If you need help, do you have trouble finding someone available to you? No   Have you been bothered in the last four weeks by sexual problems? No   Do you have difficulty concentrating, remembering or making decisions? No       Age-appropriate Screening Schedule:  Refer to the list below for future screening recommendations based on patient's age, sex and/or medical conditions. Orders for these recommended tests are listed in the plan section. The patient has been provided with a written plan.    Health Maintenance   Topic Date Due   • Hepatitis B (1 of 3 - Risk 3-dose series) Never done   • ZOSTER VACCINE (2 of 2) 11/16/2017   • MAMMOGRAM  01/12/2023   • URINE MICROALBUMIN  05/11/2023   • INFLUENZA VACCINE  08/01/2023   • LIPID PANEL  11/09/2023   • HEMOGLOBIN A1C  11/17/2023   • DXA SCAN  01/12/2024   • DIABETIC EYE EXAM  03/10/2024   • ANNUAL WELLNESS VISIT  05/17/2024   • DIABETIC FOOT EXAM  05/17/2024   • COLORECTAL CANCER SCREENING  05/04/2031   • COVID-19 Vaccine  Completed   • Pneumococcal Vaccine 65+  Completed   • HEPATITIS C SCREENING  Addressed   • TDAP/TD VACCINES  Discontinued                  CMS Preventative Services Quick Reference  Risk Factors Identified During Encounter  Immunizations Discussed/Encouraged: Hepatitis B Vaccine/Series and Shingrix  The above risks/problems have been discussed with the patient.  Pertinent information has been shared with the patient in the After Visit Summary.  An After Visit Summary and PPPS were made available to the patient.    Follow Up:   Next Medicare Wellness visit to be scheduled in 1 year.       Additional E&M Note during  same encounter follows:  Patient has multiple medical problems which are significant and separately identifiable that require additional work above and beyond the Medicare Wellness Visit.      Chief Complaint  Medicare Wellness-subsequent    Subjective        HPI  Claire Lux is also being seen today for DM2, HLP and HTN. Followed by Dr. Mccall for her NAFL.  She has lost appx 17 lbs in the last year. Unintentional.  Denies any fatigue, weakness. Some sinus drainage.    Ms. Lux's hemoglobin A1c is at 5.9 percent today. Patient confirms taking metformin as prescribed and denies diarrhea with its use.     She has lost 7 pounds since her last visit and 16 to 17 pounds unintentionally over the last year. The patient denies changing her diet and reports the only change she has made is doing more yard work recently.    For SMALLS, she last saw Dr. Claire Mccall on 12/2022 and will follow-up again in 06/2023.     The patient does complain of occasional sinus drainage but states this is normal for her around this time of year.     Additionally, there is mild bilateral lower extremity swelling at present. Pertinent negatives include exertional dyspnea.    Review of Systems   Constitutional: Negative for chills, fatigue and fever.   HENT: Negative for ear pain, hearing loss, nosebleeds, postnasal drip, sinus pressure and sore throat.    Eyes: Negative for pain, redness and itching.   Respiratory: Negative for cough and shortness of breath.    Cardiovascular: Negative for chest pain.   Gastrointestinal: Negative for abdominal distention, abdominal pain, blood in stool, constipation and diarrhea.   Endocrine: Negative for cold intolerance, heat intolerance and polyuria.   Genitourinary: Negative for dysuria, flank pain, hematuria and pelvic pain.   Musculoskeletal: Negative for back pain, joint swelling and myalgias.   Skin: Negative for pallor and wound.   Neurological: Negative for dizziness, seizures and confusion.  "  Psychiatric/Behavioral: Negative for agitation, hallucinations and sleep disturbance.       Objective   Vital Signs:  /70   Pulse 80   Temp 97.7 °F (36.5 °C)   Ht 152.4 cm (60\")   Wt 83.5 kg (184 lb)   SpO2 99% Comment: RA  BMI 35.94 kg/m²     Physical Exam  Constitutional:       General: She is not in acute distress.     Appearance: Normal appearance.   HENT:      Head: Normocephalic and atraumatic.      Right Ear: Tympanic membrane and external ear normal.      Left Ear: Tympanic membrane and external ear normal.      Nose: Nose normal.      Mouth/Throat:      Mouth: Mucous membranes are moist.   Eyes:      General: No scleral icterus.  Neck:      Vascular: No carotid bruit.   Cardiovascular:      Rate and Rhythm: Normal rate and regular rhythm.      Pulses: Normal pulses.      Heart sounds: Murmur heard.     No friction rub. No gallop.   Pulmonary:      Effort: Pulmonary effort is normal.      Breath sounds: Normal breath sounds. No rhonchi or rales.   Abdominal:      General: Bowel sounds are normal. There is no distension.      Palpations: Abdomen is soft.      Tenderness: There is no right CVA tenderness, left CVA tenderness, guarding or rebound.      Hernia: No hernia is present.   Musculoskeletal:         General: No tenderness. Normal range of motion.      Cervical back: Normal range of motion.      Right lower leg: Edema (1+) present.      Left lower leg: Edema (1+) present.      Right foot: No deformity.      Left foot: No deformity.   Feet:      Right foot:      Skin integrity: Skin integrity normal.      Left foot:      Skin integrity: Skin integrity normal.      Comments:      Lymphadenopathy:      Cervical: No cervical adenopathy.   Skin:     General: Skin is warm.      Findings: No rash.   Neurological:      General: No focal deficit present.      Mental Status: She is alert and oriented to person, place, and time. Mental status is at baseline.      Cranial Nerves: No cranial nerve " deficit.      Sensory: No sensory deficit.      Coordination: Coordination normal.      Gait: Gait normal.      Deep Tendon Reflexes: Reflexes normal.   Psychiatric:         Mood and Affect: Mood normal.         Behavior: Behavior normal.     POC Glycosylated Hemoglobin (Hb A1C) (05/17/2023 08:53)                            Current Outpatient Medications:   •  amlodipine-olmesartan (JENNI) 10-40 MG per tablet, Take 1 tablet by mouth Daily. for blood pressure, Disp: 90 tablet, Rfl: 3  •  BABY ASPIRIN PO, Take  by mouth., Disp: , Rfl:   •  Cholecalciferol (VITAMIN D) 50 MCG (2000 UT) capsule, Take 1 capsule by mouth Daily., Disp: , Rfl:   •  glucose blood test strip, Accu-Chek Pricila Plus In Vitro Strip; Patient Sig: Accu-Chek Pricila Plus In Vitro Strip USE TO TEST DAILY; 1; 0; 09-Nov-2012; Active, Disp: , Rfl:   •  metFORMIN ER (GLUCOPHAGE-XR) 500 MG 24 hr tablet, Take 1 tablet by mouth Daily With Breakfast., Disp: 90 tablet, Rfl: 3  •  metoprolol succinate XL (TOPROL-XL) 100 MG 24 hr tablet, Take 1 tablet by mouth Daily., Disp: 90 tablet, Rfl: 3  •  Multiple Vitamins-Minerals (MULTI VITAMIN/MINERALS PO), Take 1 tablet by mouth Daily., Disp: , Rfl:   •  niacin 500 MG tablet, Take 2 tablets by mouth 2 (Two) Times a Day., Disp: , Rfl:   •  Omega-3 Fatty Acids (EQL FISH OIL) 1000 MG capsule, Take  by mouth daily., Disp: , Rfl:   •  pantoprazole (PROTONIX) 40 MG EC tablet, Take 1 tablet by mouth Daily., Disp: , Rfl:   •  pravastatin (PRAVACHOL) 40 MG tablet, Take 1 tablet by mouth Daily., Disp: 90 tablet, Rfl: 3  •  psyllium (METAMUCIL) 0.52 G capsule, Take 3 capsules by mouth 2 (Two) Times a Day., Disp: , Rfl:   •  ursodiol (ACTIGALL) 300 MG capsule, Take 2 capsules by mouth 2 (Two) Times a Day., Disp: , Rfl:   •  Vitamin E 400 UNITS tablet, Take 1 tablet by mouth daily., Disp: , Rfl:       The following portions of the patient's history were reviewed and updated as appropriate: allergies, current medications, past family  "history, past medical history, past social history, past surgical history and problem list.        Objective   /70   Pulse 80   Temp 97.7 °F (36.5 °C)   Ht 152.4 cm (60\")   Wt 83.5 kg (184 lb)   SpO2 99% Comment: RA  BMI 35.94 kg/m²         Results for orders placed or performed in visit on 05/17/23   POC Glycosylated Hemoglobin (Hb A1C)    Specimen: Blood   Result Value Ref Range    Hemoglobin A1C 5.9 %    Lot Number [id number]     Expiration Date 1/31/25              Assessment & Plan   Diagnoses and all orders for this visit:    Medicare annual wellness visit, subsequent    Type 2 diabetes mellitus with other specified complication, without long-term current use of insulin  -     POCT microalbumin  -     POC Glycosylated Hemoglobin (Hb A1C)  -     metFORMIN ER (GLUCOPHAGE-XR) 500 MG 24 hr tablet; Take 1 tablet by mouth Daily With Breakfast.    Benign essential hypertension  -     amlodipine-olmesartan (JENNI) 10-40 MG per tablet; Take 1 tablet by mouth Daily. for blood pressure  -     metoprolol succinate XL (TOPROL-XL) 100 MG 24 hr tablet; Take 1 tablet by mouth Daily.    Mixed hyperlipidemia  -     pravastatin (PRAVACHOL) 40 MG tablet; Take 1 tablet by mouth Daily.  -     Lipid Panel; Future  -     TSH Rfx On Abnormal To Free T4; Future    Vitamin D deficiency  -     Vitamin B12; Future  -     Vitamin D,25-Hydroxy; Future    Nonalcoholic fatty liver disease  -     Comprehensive Metabolic Panel; Future  -     Protime-INR; Future  -     CBC (No Diff); Future  -     AFP Tumor Marker; Future    Unintentional weight loss  -     Sedimentation Rate; Future  -     XR Chest PA & Lateral  -     CT Abdomen Pelvis Without Contrast; Future    Chronic cough  -     proBNP  -     XR Chest PA & Lateral    Dyspnea, unspecified type  -     proBNP    Other abnormal tumor markers  -     AFP Tumor Marker; Future    Other orders  -     BABY ASPIRIN PO; Take  by mouth.    Denies any complaints of weakness or pain. Will " proceed with labs and CT + CXR as above to further evaluate her weight loss           PHQ-2/PHQ-9 Depression screening reviewed with patient . Total time spent today for depression screening  with Claire Lux  was 15 minutes in counseling, along with plans for any diagnositc work-up and treatment.    Advice and education were given regarding cardiovascular risk reduction, healthy dietary habits, Seatbelt and helmet use and self skin examination.          Electronically signed by:    Rosemarie Marley MD        Follow Up   No follow-ups on file.  Patient was given instructions and counseling regarding her condition or for health maintenance advice. Please see specific information pulled into the AVS if appropriate.     Transcribed from ambient dictation for Rosemarie Marley MD by Zee Chaudhari.  05/17/23   12:04 EDT    Patient or patient representative verbalized consent to the visit recording.

## 2023-06-15 ENCOUNTER — HOSPITAL ENCOUNTER (OUTPATIENT)
Dept: MAMMOGRAPHY | Facility: HOSPITAL | Age: 73
Discharge: HOME | End: 2023-06-15
Admitting: INTERNAL MEDICINE
Payer: MEDICARE

## 2023-06-15 DIAGNOSIS — Z12.31 VISIT FOR SCREENING MAMMOGRAM: ICD-10-CM

## 2023-06-15 PROCEDURE — 77063 BREAST TOMOSYNTHESIS BI: CPT

## 2023-06-15 PROCEDURE — 77067 SCR MAMMO BI INCL CAD: CPT

## 2023-11-17 ENCOUNTER — OFFICE VISIT (OUTPATIENT)
Dept: INTERNAL MEDICINE | Facility: CLINIC | Age: 73
End: 2023-11-17
Payer: MEDICARE

## 2023-11-17 ENCOUNTER — LAB (OUTPATIENT)
Dept: LAB | Facility: HOSPITAL | Age: 73
End: 2023-11-17
Payer: MEDICARE

## 2023-11-17 VITALS
DIASTOLIC BLOOD PRESSURE: 70 MMHG | OXYGEN SATURATION: 97 % | SYSTOLIC BLOOD PRESSURE: 126 MMHG | HEART RATE: 82 BPM | HEIGHT: 60 IN | TEMPERATURE: 98 F | BODY MASS INDEX: 35.14 KG/M2 | WEIGHT: 179 LBS

## 2023-11-17 DIAGNOSIS — Z11.59 ENCOUNTER FOR SCREENING FOR OTHER VIRAL DISEASES: ICD-10-CM

## 2023-11-17 DIAGNOSIS — K76.0 NONALCOHOLIC FATTY LIVER DISEASE: ICD-10-CM

## 2023-11-17 DIAGNOSIS — E78.2 MIXED HYPERLIPIDEMIA: ICD-10-CM

## 2023-11-17 DIAGNOSIS — I10 BENIGN ESSENTIAL HYPERTENSION: ICD-10-CM

## 2023-11-17 DIAGNOSIS — E11.69 TYPE 2 DIABETES MELLITUS WITH OTHER SPECIFIED COMPLICATION, WITHOUT LONG-TERM CURRENT USE OF INSULIN: Primary | ICD-10-CM

## 2023-11-17 LAB
EXPIRATION DATE: NORMAL
HBA1C MFR BLD: 5.3 % (ref 4.5–5.7)
Lab: NORMAL

## 2023-11-17 PROCEDURE — 80053 COMPREHEN METABOLIC PANEL: CPT

## 2023-11-17 PROCEDURE — 36415 COLL VENOUS BLD VENIPUNCTURE: CPT

## 2023-11-17 PROCEDURE — 80061 LIPID PANEL: CPT

## 2023-11-17 PROCEDURE — 86706 HEP B SURFACE ANTIBODY: CPT

## 2023-11-17 NOTE — PROGRESS NOTES
Diabetes and Hypertension    Subjective   Claire Lux is a 73 y.o. female is here today for follow-up.    History of Present Illness  Sharlene reports she has been doing okay.started the ozempic 2 mos ago, and doing well, though having a hard time getting up.  Here for a f/u on her htn, hlp and dm2. Has lost 5 lbs since starting the ozempic.    Answers submitted by the patient for this visit:  Primary Reason for Visit (Submitted on 11/10/2023)  What is the primary reason for your visit?: Diabetes      Current Outpatient Medications:     Semaglutide,0.25 or 0.5MG/DOS, (OZEMPIC) 2 MG/3ML solution pen-injector, Inject 0.25 mg under the skin into the appropriate area as directed Every 7 (Seven) Days., Disp: , Rfl:     amlodipine-olmesartan (JENNI) 10-40 MG per tablet, Take 1 tablet by mouth Daily. for blood pressure, Disp: 90 tablet, Rfl: 3    BABY ASPIRIN PO, Take  by mouth., Disp: , Rfl:     Cholecalciferol (VITAMIN D) 50 MCG (2000 UT) capsule, Take 1 capsule by mouth Daily., Disp: , Rfl:     glucose blood test strip, Accu-Chek Pricila Plus In Vitro Strip; Patient Sig: Accu-Chek Pricila Plus In Vitro Strip USE TO TEST DAILY; 1; 0; 09-Nov-2012; Active, Disp: , Rfl:     metoprolol succinate XL (TOPROL-XL) 100 MG 24 hr tablet, Take 1 tablet by mouth Daily., Disp: 90 tablet, Rfl: 3    Multiple Vitamins-Minerals (MULTI VITAMIN/MINERALS PO), Take 1 tablet by mouth Daily., Disp: , Rfl:     niacin 500 MG tablet, Take 2 tablets by mouth 2 (Two) Times a Day., Disp: , Rfl:     Omega-3 Fatty Acids (EQL FISH OIL) 1000 MG capsule, Take  by mouth daily., Disp: , Rfl:     pantoprazole (PROTONIX) 40 MG EC tablet, Take 1 tablet by mouth Daily., Disp: , Rfl:     pravastatin (PRAVACHOL) 40 MG tablet, Take 1 tablet by mouth Daily., Disp: 90 tablet, Rfl: 3    psyllium (METAMUCIL) 0.52 G capsule, Take 3 capsules by mouth 2 (Two) Times a Day., Disp: , Rfl:     ursodiol (ACTIGALL) 300 MG capsule, Take 2 capsules by mouth 2 (Two) Times a Day.,  "Disp: , Rfl:     Vitamin E 400 UNITS tablet, Take 1 tablet by mouth daily., Disp: , Rfl:       The following portions of the patient's history were reviewed and updated as appropriate: allergies, current medications, past family history, past medical history, past social history, past surgical history and problem list.    Review of Systems   Constitutional: Negative.  Negative for chills and fever.   HENT:  Negative for ear discharge, ear pain, sinus pressure and sore throat.    Respiratory:  Negative for cough, chest tightness and shortness of breath.    Cardiovascular:  Negative for chest pain, palpitations and leg swelling.   Gastrointestinal:  Negative for diarrhea, nausea and vomiting.   Endocrine: Negative for polydipsia and polyuria.   Musculoskeletal:  Negative for arthralgias, back pain and myalgias.   Neurological:  Negative for dizziness, tremors, syncope, speech difficulty and headaches.   Psychiatric/Behavioral:  Negative for confusion and sleep disturbance.        Objective   /70   Pulse 82   Temp 98 °F (36.7 °C)   Ht 152.4 cm (60\")   Wt 81.2 kg (179 lb)   SpO2 97% Comment: ra  BMI 34.96 kg/m²   Physical Exam  Vitals and nursing note reviewed.   Constitutional:       Appearance: She is well-developed.   HENT:      Head: Normocephalic and atraumatic.      Right Ear: External ear normal.      Left Ear: External ear normal.      Mouth/Throat:      Pharynx: No oropharyngeal exudate.   Eyes:      Conjunctiva/sclera: Conjunctivae normal.      Pupils: Pupils are equal, round, and reactive to light.   Neck:      Thyroid: No thyromegaly.   Cardiovascular:      Rate and Rhythm: Normal rate and regular rhythm.      Pulses: Normal pulses.      Heart sounds: Normal heart sounds. No murmur heard.     No friction rub. No gallop.   Pulmonary:      Effort: Pulmonary effort is normal.      Breath sounds: Normal breath sounds.   Abdominal:      General: Bowel sounds are normal. There is no distension.      " Palpations: Abdomen is soft.      Tenderness: There is no abdominal tenderness.   Musculoskeletal:      Cervical back: Neck supple.   Skin:     General: Skin is warm and dry.   Neurological:      Mental Status: She is alert and oriented to person, place, and time.      Cranial Nerves: No cranial nerve deficit.   Psychiatric:         Judgment: Judgment normal.                 Results for orders placed or performed in visit on 11/17/23   POC Glycosylated Hemoglobin (Hb A1C)    Specimen: Blood   Result Value Ref Range    Hemoglobin A1C 5.3 4.5 - 5.7 %    Lot Number [id number]     Expiration Date 6/6/25              Assessment & Plan   Diagnoses and all orders for this visit:    Type 2 diabetes mellitus with other specified complication, without long-term current use of insulin  -     POC Glycosylated Hemoglobin (Hb A1C)    Benign essential hypertension  -     Comprehensive Metabolic Panel; Future    Mixed hyperlipidemia  -     Comprehensive Metabolic Panel; Future  -     Lipid Panel; Future    Nonalcoholic fatty liver disease  -     Hepatitis B Surface Antibody; Future  -     Comprehensive Metabolic Panel; Future    Encounter for screening for other viral diseases  -     Hepatitis B Surface Antibody; Future    Other orders  -     Semaglutide,0.25 or 0.5MG/DOS, (OZEMPIC) 2 MG/3ML solution pen-injector; Inject 0.25 mg under the skin into the appropriate area as directed Every 7 (Seven) Days.    Adv to stop metformin and just keep it to bridge the ozempic if available.  Check titer and if -ve needs hep B series.           Return for Next scheduled follow up, Medicare Wellness.    Electronically signed by:    Rosemarie Marley MD

## 2023-11-18 LAB
ALBUMIN SERPL-MCNC: 5.3 G/DL (ref 3.5–5.2)
ALBUMIN/GLOB SERPL: 1.7 G/DL
ALP SERPL-CCNC: 90 U/L (ref 39–117)
ALT SERPL W P-5'-P-CCNC: 29 U/L (ref 1–33)
ANION GAP SERPL CALCULATED.3IONS-SCNC: 11.9 MMOL/L (ref 5–15)
AST SERPL-CCNC: 44 U/L (ref 1–32)
BILIRUB SERPL-MCNC: 0.9 MG/DL (ref 0–1.2)
BUN SERPL-MCNC: 15 MG/DL (ref 8–23)
BUN/CREAT SERPL: 15.6 (ref 7–25)
CALCIUM SPEC-SCNC: 10.8 MG/DL (ref 8.6–10.5)
CHLORIDE SERPL-SCNC: 102 MMOL/L (ref 98–107)
CHOLEST SERPL-MCNC: 138 MG/DL (ref 0–200)
CO2 SERPL-SCNC: 27.1 MMOL/L (ref 22–29)
CREAT SERPL-MCNC: 0.96 MG/DL (ref 0.57–1)
EGFRCR SERPLBLD CKD-EPI 2021: 61.8 ML/MIN/1.73
GLOBULIN UR ELPH-MCNC: 3.1 GM/DL
GLUCOSE SERPL-MCNC: 115 MG/DL (ref 65–99)
HBV SURFACE AB SER RIA-ACNC: REACTIVE
HDLC SERPL-MCNC: 48 MG/DL (ref 40–60)
LDLC SERPL CALC-MCNC: 61 MG/DL (ref 0–100)
LDLC/HDLC SERPL: 1.15 {RATIO}
POTASSIUM SERPL-SCNC: 4.9 MMOL/L (ref 3.5–5.2)
PROT SERPL-MCNC: 8.4 G/DL (ref 6–8.5)
SODIUM SERPL-SCNC: 141 MMOL/L (ref 136–145)
TRIGL SERPL-MCNC: 174 MG/DL (ref 0–150)
VLDLC SERPL-MCNC: 29 MG/DL (ref 5–40)

## 2023-11-20 ENCOUNTER — TELEPHONE (OUTPATIENT)
Dept: INTERNAL MEDICINE | Facility: CLINIC | Age: 73
End: 2023-11-20
Payer: MEDICARE

## 2023-11-20 NOTE — TELEPHONE ENCOUNTER
----- Message from Rosemarie Marley MD sent at 11/19/2023 11:39 PM EST -----  Please make sure patient has seen the following [employer] message:       Your hepatitis B test is showing that you are immune to hepatitis B.  Therefore you do not need the vaccination.  I have updated your chart.  Cholesterol and electrolytes look good.  Continue current regimen.    Calcium is a little high.  Please stop all calcium supplements and stay on OTC vitamin D3-2000 units daily.

## 2024-05-13 DIAGNOSIS — E11.69 TYPE 2 DIABETES MELLITUS WITH OTHER SPECIFIED COMPLICATION, WITHOUT LONG-TERM CURRENT USE OF INSULIN: ICD-10-CM

## 2024-05-13 DIAGNOSIS — I10 BENIGN ESSENTIAL HYPERTENSION: ICD-10-CM

## 2024-05-13 RX ORDER — METFORMIN HYDROCHLORIDE 500 MG/1
500 TABLET, EXTENDED RELEASE ORAL
Qty: 90 TABLET | Refills: 3 | OUTPATIENT
Start: 2024-05-13

## 2024-05-13 RX ORDER — AMLODIPINE AND OLMESARTAN MEDOXOMIL 10; 40 MG/1; MG/1
1 TABLET ORAL DAILY
Qty: 90 TABLET | Refills: 0 | Status: SHIPPED | OUTPATIENT
Start: 2024-05-13

## 2024-05-24 ENCOUNTER — LAB (OUTPATIENT)
Dept: LAB | Facility: HOSPITAL | Age: 74
End: 2024-05-24
Payer: MEDICARE

## 2024-05-24 ENCOUNTER — OFFICE VISIT (OUTPATIENT)
Dept: INTERNAL MEDICINE | Facility: CLINIC | Age: 74
End: 2024-05-24
Payer: MEDICARE

## 2024-05-24 VITALS
TEMPERATURE: 98.2 F | SYSTOLIC BLOOD PRESSURE: 128 MMHG | OXYGEN SATURATION: 99 % | HEIGHT: 60 IN | HEART RATE: 75 BPM | WEIGHT: 167 LBS | BODY MASS INDEX: 32.79 KG/M2 | DIASTOLIC BLOOD PRESSURE: 72 MMHG

## 2024-05-24 DIAGNOSIS — R01.1 HEART MURMUR: ICD-10-CM

## 2024-05-24 DIAGNOSIS — Z00.00 MEDICARE ANNUAL WELLNESS VISIT, SUBSEQUENT: Primary | ICD-10-CM

## 2024-05-24 DIAGNOSIS — I10 BENIGN ESSENTIAL HYPERTENSION: ICD-10-CM

## 2024-05-24 DIAGNOSIS — K76.0 NONALCOHOLIC FATTY LIVER DISEASE: ICD-10-CM

## 2024-05-24 DIAGNOSIS — E11.69 TYPE 2 DIABETES MELLITUS WITH OTHER SPECIFIED COMPLICATION, WITHOUT LONG-TERM CURRENT USE OF INSULIN: ICD-10-CM

## 2024-05-24 DIAGNOSIS — E55.9 VITAMIN D DEFICIENCY: ICD-10-CM

## 2024-05-24 DIAGNOSIS — E78.2 MIXED HYPERLIPIDEMIA: ICD-10-CM

## 2024-05-24 LAB
DEPRECATED RDW RBC AUTO: 44.2 FL (ref 37–54)
ERYTHROCYTE [DISTWIDTH] IN BLOOD BY AUTOMATED COUNT: 13.1 % (ref 12.3–15.4)
EXPIRATION DATE: NORMAL
EXPIRATION DATE: NORMAL
HBA1C MFR BLD: 5.4 % (ref 4.5–5.7)
HCT VFR BLD AUTO: 46.9 % (ref 34–46.6)
HGB BLD-MCNC: 15.4 G/DL (ref 12–15.9)
Lab: NORMAL
Lab: NORMAL
MCH RBC QN AUTO: 30 PG (ref 26.6–33)
MCHC RBC AUTO-ENTMCNC: 32.8 G/DL (ref 31.5–35.7)
MCV RBC AUTO: 91.4 FL (ref 79–97)
PLATELET # BLD AUTO: 262 10*3/MM3 (ref 140–450)
PMV BLD AUTO: 12.3 FL (ref 6–12)
POC CREATININE URINE: 10
POC MICROALBUMIN URINE: 10
RBC # BLD AUTO: 5.13 10*6/MM3 (ref 3.77–5.28)
WBC NRBC COR # BLD AUTO: 8.8 10*3/MM3 (ref 3.4–10.8)

## 2024-05-24 PROCEDURE — 85027 COMPLETE CBC AUTOMATED: CPT

## 2024-05-24 PROCEDURE — 84443 ASSAY THYROID STIM HORMONE: CPT

## 2024-05-24 PROCEDURE — 80061 LIPID PANEL: CPT

## 2024-05-24 PROCEDURE — 82306 VITAMIN D 25 HYDROXY: CPT

## 2024-05-24 PROCEDURE — 82607 VITAMIN B-12: CPT

## 2024-05-24 PROCEDURE — 80053 COMPREHEN METABOLIC PANEL: CPT

## 2024-05-24 RX ORDER — METOPROLOL SUCCINATE 100 MG/1
100 TABLET, EXTENDED RELEASE ORAL DAILY
Qty: 90 TABLET | Refills: 3 | Status: SHIPPED | OUTPATIENT
Start: 2024-05-24

## 2024-05-24 RX ORDER — AMLODIPINE AND OLMESARTAN MEDOXOMIL 10; 40 MG/1; MG/1
1 TABLET ORAL DAILY
Qty: 90 TABLET | Refills: 3 | Status: SHIPPED | OUTPATIENT
Start: 2024-05-24

## 2024-05-24 RX ORDER — PRAVASTATIN SODIUM 40 MG
40 TABLET ORAL DAILY
Qty: 90 TABLET | Refills: 3 | Status: SHIPPED | OUTPATIENT
Start: 2024-05-24

## 2024-05-24 NOTE — PROGRESS NOTES
The ABCs of the Annual Wellness Visit  Subsequent Medicare Wellness Visit    Subjective    Claire Lux is a 73 y.o. female who presents for a Subsequent Medicare Wellness Visit.    The following portions of the patient's history were reviewed and   updated as appropriate: allergies, current medications, past family history, past medical history, past social history, past surgical history, and problem list.    Compared to one year ago, the patient feels her physical   health is the same.    Compared to one year ago, the patient feels her mental   health is the same.    Recent Hospitalizations:  She was not admitted to the hospital during the last year.       Current Medical Providers:  Patient Care Team:  Rosemarie Marley MD as PCP - General  Claire Mccall MD as Consulting Physician (Gastroenterology)    Outpatient Medications Prior to Visit   Medication Sig Dispense Refill    BABY ASPIRIN PO Take  by mouth.      Cholecalciferol (VITAMIN D) 50 MCG (2000 UT) capsule Take 1 capsule by mouth Daily.      glucose blood test strip Accu-Chek Pricila Plus In Vitro Strip; Patient Sig: Accu-Chek Pricila Plus In Vitro Strip USE TO TEST DAILY; 1; 0; 09-Nov-2012; Active      Multiple Vitamins-Minerals (MULTI VITAMIN/MINERALS PO) Take 1 tablet by mouth Daily.      niacin 500 MG tablet Take 2 tablets by mouth 2 (Two) Times a Day.      Omega-3 Fatty Acids (EQL FISH OIL) 1000 MG capsule Take  by mouth daily.      pantoprazole (PROTONIX) 40 MG EC tablet Take 1 tablet by mouth Daily.      psyllium (METAMUCIL) 0.52 G capsule Take 3 capsules by mouth 2 (Two) Times a Day.      Semaglutide,0.25 or 0.5MG/DOS, (OZEMPIC) 2 MG/3ML solution pen-injector Inject 0.25 mg under the skin into the appropriate area as directed Every 7 (Seven) Days.      ursodiol (ACTIGALL) 300 MG capsule Take 2 capsules by mouth 2 (Two) Times a Day.      Vitamin E 400 UNITS tablet Take 1 tablet by mouth daily.      amlodipine-olmesartan (JENNI) 10-40 MG per  "tablet TAKE 1 TABLET DAILY FOR BLOOD PRESSURE 90 tablet 0    metoprolol succinate XL (TOPROL-XL) 100 MG 24 hr tablet Take 1 tablet by mouth Daily. 90 tablet 3    pravastatin (PRAVACHOL) 40 MG tablet Take 1 tablet by mouth Daily. 90 tablet 3     No facility-administered medications prior to visit.       No opioid medication identified on active medication list. I have reviewed chart for other potential  high risk medication/s and harmful drug interactions in the elderly.        Aspirin is on active medication list. Aspirin use is indicated based on review of current medical condition/s. Pros and cons of this therapy have been discussed today. Benefits of this medication outweigh potential harm.  Patient has been encouraged to continue taking this medication.  .      Patient Active Problem List   Diagnosis    Abnormal weight gain    Benign essential hypertension    Hypercalcemia    Hyperlipidemia    Nonalcoholic fatty liver disease    Type 2 diabetes mellitus    Postmenopausal disorder     Advance Care Planning   Advance Care Planning     Advance Directive is on file.  ACP discussion was held with the patient during this visit. Patient has an advance directive in EMR which is still valid.      Objective    Vitals:    05/24/24 0828   BP: 128/72   Pulse: 75   Temp: 98.2 °F (36.8 °C)   SpO2: 99%  Comment: ra   Weight: 75.8 kg (167 lb)   Height: 152.4 cm (60\")   PainSc: 0-No pain     Estimated body mass index is 32.61 kg/m² as calculated from the following:    Height as of this encounter: 152.4 cm (60\").    Weight as of this encounter: 75.8 kg (167 lb).           Does the patient have evidence of cognitive impairment? No    Lab Results   Component Value Date    HGBA1C 5.4 05/24/2024        HEALTH RISK ASSESSMENT    Smoking Status:  Social History     Tobacco Use   Smoking Status Never   Smokeless Tobacco Never     Alcohol Consumption:  Social History     Substance and Sexual Activity   Alcohol Use No     Fall Risk " Screen:    SUDHAKAR Fall Risk Assessment was completed, and patient is at LOW risk for falls.Assessment completed on:2024    Depression Screenin/24/2024     8:36 AM   PHQ-2/PHQ-9 Depression Screening   Little Interest or Pleasure in Doing Things 0-->not at all   Feeling Down, Depressed or Hopeless 0-->not at all   PHQ-9: Brief Depression Severity Measure Score 0       Health Habits and Functional and Cognitive Screenin/17/2024     8:47 AM   Functional & Cognitive Status   Do you have difficulty preparing food and eating? No   Do you have difficulty bathing yourself, getting dressed or grooming yourself? No   Do you have difficulty using the toilet? No   Do you have difficulty moving around from place to place? No   Do you have trouble with steps or getting out of a bed or a chair? No   Current Diet Limited Junk Food   Dental Exam Not up to date   Eye Exam Up to date   Exercise (times per week) 2 times per week   Current Exercises Include No Regular Exercise   Do you need help using the phone?  No   Are you deaf or do you have serious difficulty hearing?  No   Do you need help to go to places out of walking distance? No   Do you need help shopping? No   Do you need help preparing meals?  No   Do you need help with housework?  No   Do you need help with laundry? No   Do you need help taking your medications? No   Do you need help managing money? No   Do you ever drive or ride in a car without wearing a seat belt? No   Have you felt unusual stress, anger or loneliness in the last month? No   Who do you live with? Sibling   If you need help, do you have trouble finding someone available to you? No   Have you been bothered in the last four weeks by sexual problems? No   Do you have difficulty concentrating, remembering or making decisions? No       Age-appropriate Screening Schedule:  Refer to the list below for future screening recommendations based on patient's age, sex and/or medical conditions.  Orders for these recommended tests are listed in the plan section. The patient has been provided with a written plan.    Health Maintenance   Topic Date Due    DIABETIC EYE EXAM  03/10/2024    ZOSTER VACCINE (2 of 2) 05/24/2024 (Originally 11/16/2017)    COVID-19 Vaccine (8 - 2023-24 season) 08/13/2024 (Originally 2/5/2024)    RSV Vaccine - Adults (1 - 1-dose 60+ series) 05/24/2025 (Originally 10/24/2008)    INFLUENZA VACCINE  08/01/2024    BMI FOLLOWUP  11/13/2024    LIPID PANEL  11/17/2024    HEMOGLOBIN A1C  11/24/2024    DXA SCAN  01/12/2025    ANNUAL WELLNESS VISIT  05/24/2025    DIABETIC FOOT EXAM  05/24/2025    URINE MICROALBUMIN  05/24/2025    COLORECTAL CANCER SCREENING  05/04/2031    Hepatitis B  Completed    Pneumococcal Vaccine 65+  Completed    HEPATITIS C SCREENING  Addressed    TDAP/TD VACCINES  Discontinued                  CMS Preventative Services Quick Reference  Risk Factors Identified During Encounter  Immunizations Discussed/Encouraged: Shingrix and RSV (Respiratory Syncytial Virus)  The above risks/problems have been discussed with the patient.  Pertinent information has been shared with the patient in the After Visit Summary.  An After Visit Summary and PPPS were made available to the patient.    Follow Up:   Next Medicare Wellness visit to be scheduled in 1 year.       Additional E&M Note during same encounter follows:  Patient has multiple medical problems which are significant and separately identifiable that require additional work above and beyond the Medicare Wellness Visit.      Chief Complaint  Medicare Wellness-subsequent    Subjective        HPI  Claire Lux is also being seen today for a f/u on her dm2, htn and hlp. Was started on Ozempic by Dr. Mccall for her SMALLS, and has lost 12 lbs since then    Review of Systems   Constitutional:  Negative for chills and fever.   HENT:  Negative for congestion, ear pain and sore throat.    Eyes:  Negative for pain, redness and visual  "disturbance.   Respiratory:  Negative for cough and shortness of breath.    Cardiovascular:  Negative for chest pain, palpitations and leg swelling.   Gastrointestinal:  Negative for abdominal pain, diarrhea and nausea.   Endocrine: Negative for cold intolerance and heat intolerance.   Genitourinary:  Negative for flank pain and urgency.   Musculoskeletal:  Negative for arthralgias and gait problem.   Skin:  Negative for pallor and rash.   Neurological:  Negative for dizziness and weakness.   Psychiatric/Behavioral:  Negative for dysphoric mood and sleep disturbance. The patient is not nervous/anxious.        Objective   Vital Signs:  /72   Pulse 75   Temp 98.2 °F (36.8 °C)   Ht 152.4 cm (60\")   Wt 75.8 kg (167 lb)   SpO2 99% Comment: ra  BMI 32.61 kg/m²     Physical Exam  Constitutional:       General: She is not in acute distress.     Appearance: Normal appearance.   HENT:      Head: Normocephalic and atraumatic.      Right Ear: Tympanic membrane and external ear normal.      Left Ear: Tympanic membrane and external ear normal.      Nose: Nose normal.      Mouth/Throat:      Mouth: Mucous membranes are moist.   Eyes:      General: No scleral icterus.  Neck:      Vascular: No carotid bruit.   Cardiovascular:      Rate and Rhythm: Normal rate and regular rhythm.      Pulses: Normal pulses.           Dorsalis pedis pulses are 1+ on the right side and 1+ on the left side.      Heart sounds: Murmur heard.      Crescendo decrescendo systolic murmur is present with a grade of 3/6.      No friction rub. No gallop.   Pulmonary:      Effort: Pulmonary effort is normal.      Breath sounds: Normal breath sounds. No rhonchi or rales.   Abdominal:      General: Bowel sounds are normal. There is no distension.      Palpations: Abdomen is soft.      Tenderness: There is no right CVA tenderness, left CVA tenderness, guarding or rebound.      Hernia: No hernia is present.   Musculoskeletal:         General: No " tenderness. Normal range of motion.      Cervical back: Normal range of motion.      Right lower leg: No edema.      Left lower leg: No edema.      Right foot: No deformity.      Left foot: No deformity.   Feet:      Right foot:      Skin integrity: Skin integrity normal.      Left foot:      Skin integrity: Skin integrity normal.      Comments: Diabetic Foot Exam Performed and Monofilament Test Performed- wnl     Lymphadenopathy:      Cervical: No cervical adenopathy.   Skin:     General: Skin is warm.      Findings: No rash.   Neurological:      General: No focal deficit present.      Mental Status: She is alert and oriented to person, place, and time. Mental status is at baseline.      Cranial Nerves: No cranial nerve deficit.      Sensory: No sensory deficit.      Coordination: Coordination normal.      Gait: Gait normal.      Deep Tendon Reflexes: Reflexes normal.   Psychiatric:         Mood and Affect: Mood normal.         Behavior: Behavior normal.                  ECG 12 Lead    Date/Time: 5/24/2024 10:08 AM  Performed by: Rosemarie Marley MD    Authorized by: Rosemarie Marley MD  Comparison: not compared with previous ECG   Previous ECG: no previous ECG available  Rhythm: sinus rhythm  Rate: normal  Conduction: conduction normal  Conduction: 1st degree AV block  ST Segments: ST segments normal  T Waves: T waves normal  QRS axis: left  Other: no other findings  Other findings: non-specific ST-T wave changes    Clinical impression: abnormal EKG                    Current Outpatient Medications:     amlodipine-olmesartan (JENNI) 10-40 MG per tablet, Take 1 tablet by mouth Daily. for blood pressure, Disp: 90 tablet, Rfl: 3    BABY ASPIRIN PO, Take  by mouth., Disp: , Rfl:     Cholecalciferol (VITAMIN D) 50 MCG (2000 UT) capsule, Take 1 capsule by mouth Daily., Disp: , Rfl:     glucose blood test strip, Accu-Chek Pricila Plus In Vitro Strip; Patient Sig: Accu-Chek Pricila Plus In Vitro Strip USE TO TEST DAILY; 1;  "0; 09-Nov-2012; Active, Disp: , Rfl:     metoprolol succinate XL (TOPROL-XL) 100 MG 24 hr tablet, Take 1 tablet by mouth Daily., Disp: 90 tablet, Rfl: 3    Multiple Vitamins-Minerals (MULTI VITAMIN/MINERALS PO), Take 1 tablet by mouth Daily., Disp: , Rfl:     niacin 500 MG tablet, Take 2 tablets by mouth 2 (Two) Times a Day., Disp: , Rfl:     Omega-3 Fatty Acids (EQL FISH OIL) 1000 MG capsule, Take  by mouth daily., Disp: , Rfl:     pantoprazole (PROTONIX) 40 MG EC tablet, Take 1 tablet by mouth Daily., Disp: , Rfl:     pravastatin (PRAVACHOL) 40 MG tablet, Take 1 tablet by mouth Daily., Disp: 90 tablet, Rfl: 3    psyllium (METAMUCIL) 0.52 G capsule, Take 3 capsules by mouth 2 (Two) Times a Day., Disp: , Rfl:     Semaglutide,0.25 or 0.5MG/DOS, (OZEMPIC) 2 MG/3ML solution pen-injector, Inject 0.25 mg under the skin into the appropriate area as directed Every 7 (Seven) Days., Disp: , Rfl:     ursodiol (ACTIGALL) 300 MG capsule, Take 2 capsules by mouth 2 (Two) Times a Day., Disp: , Rfl:     Vitamin E 400 UNITS tablet, Take 1 tablet by mouth daily., Disp: , Rfl:       The following portions of the patient's history were reviewed and updated as appropriate: allergies, current medications, past family history, past medical history, past social history, past surgical history and problem list.        Objective   /72   Pulse 75   Temp 98.2 °F (36.8 °C)   Ht 152.4 cm (60\")   Wt 75.8 kg (167 lb)   SpO2 99% Comment: ra  BMI 32.61 kg/m²         Results for orders placed or performed in visit on 05/24/24   POC Glycosylated Hemoglobin (Hb A1C)    Specimen: Blood   Result Value Ref Range    Hemoglobin A1C 5.4 4.5 - 5.7 %    Lot Number [id number]     Expiration Date 1/3/26    POCT microalbumin    Specimen: Urine   Result Value Ref Range    Microalbumin, Urine 10     Creatinine, Urine 10     Lot Number [id number]     Expiration Date 5/9/25              Assessment & Plan   Diagnoses and all orders for this " visit:    Medicare annual wellness visit, subsequent    Type 2 diabetes mellitus with other specified complication, without long-term current use of insulin  -     POC Glycosylated Hemoglobin (Hb A1C)  -     POCT microalbumin    Benign essential hypertension  -     amlodipine-olmesartan (JENNI) 10-40 MG per tablet; Take 1 tablet by mouth Daily. for blood pressure  -     metoprolol succinate XL (TOPROL-XL) 100 MG 24 hr tablet; Take 1 tablet by mouth Daily.    Mixed hyperlipidemia  -     pravastatin (PRAVACHOL) 40 MG tablet; Take 1 tablet by mouth Daily.  -     Comprehensive Metabolic Panel; Future  -     Lipid Panel; Future  -     TSH Rfx On Abnormal To Free T4; Future    Heart murmur  Comments:  worrisome for valvular ht dz, ekg abnl. proceed with echo and card referral if needed  Orders:  -     Adult Transthoracic Echo Complete W/ Cont if Necessary Per Protocol; Future  -     CBC (No Diff); Future    Vitamin D deficiency  -     Vitamin B12; Future  -     Vitamin D,25-Hydroxy; Future    Nonalcoholic fatty liver disease  -     CBC (No Diff); Future    Other orders  -     ECG 12 Lead      Lost 12 lbs on ozempic, commended on improved sugars.    Worsening heart murmur, Mom had CHF. Check echo to assess valve status and proceed with cardiology referral if needed.         PHQ-2/PHQ-9 Depression screening reviewed with patient . Total time spent today for depression screening  with Claire Lux  was 15 minutes in counseling, along with plans for any diagnositc work-up and treatment.    Advice and education were given regarding cardiovascular risk reduction, healthy dietary habits, Seatbelt and helmet use and self skin examination.          Electronically signed by:    Rosemarie Marley MD           Follow Up   Return in about 6 months (around 11/24/2024) for Next scheduled follow up.  Patient was given instructions and counseling regarding her condition or for health maintenance advice. Please see specific information  pulled into the AVS if appropriate.

## 2024-05-25 LAB
25(OH)D3 SERPL-MCNC: 81.1 NG/ML (ref 30–100)
ALBUMIN SERPL-MCNC: 4.9 G/DL (ref 3.5–5.2)
ALBUMIN/GLOB SERPL: 1.7 G/DL
ALP SERPL-CCNC: 81 U/L (ref 39–117)
ALT SERPL W P-5'-P-CCNC: 18 U/L (ref 1–33)
ANION GAP SERPL CALCULATED.3IONS-SCNC: 15 MMOL/L (ref 5–15)
AST SERPL-CCNC: 27 U/L (ref 1–32)
BILIRUB SERPL-MCNC: 0.8 MG/DL (ref 0–1.2)
BUN SERPL-MCNC: 16 MG/DL (ref 8–23)
BUN/CREAT SERPL: 18.6 (ref 7–25)
CALCIUM SPEC-SCNC: 10 MG/DL (ref 8.6–10.5)
CHLORIDE SERPL-SCNC: 100 MMOL/L (ref 98–107)
CHOLEST SERPL-MCNC: 132 MG/DL (ref 0–200)
CO2 SERPL-SCNC: 26 MMOL/L (ref 22–29)
CREAT SERPL-MCNC: 0.86 MG/DL (ref 0.57–1)
EGFRCR SERPLBLD CKD-EPI 2021: 70.6 ML/MIN/1.73
GLOBULIN UR ELPH-MCNC: 2.9 GM/DL
GLUCOSE SERPL-MCNC: 99 MG/DL (ref 65–99)
HDLC SERPL-MCNC: 54 MG/DL (ref 40–60)
LDLC SERPL CALC-MCNC: 55 MG/DL (ref 0–100)
LDLC/HDLC SERPL: 0.95 {RATIO}
POTASSIUM SERPL-SCNC: 4 MMOL/L (ref 3.5–5.2)
PROT SERPL-MCNC: 7.8 G/DL (ref 6–8.5)
SODIUM SERPL-SCNC: 141 MMOL/L (ref 136–145)
TRIGL SERPL-MCNC: 134 MG/DL (ref 0–150)
TSH SERPL DL<=0.05 MIU/L-ACNC: 2.62 UIU/ML (ref 0.27–4.2)
VIT B12 BLD-MCNC: 654 PG/ML (ref 211–946)
VLDLC SERPL-MCNC: 23 MG/DL (ref 5–40)

## 2024-06-17 ENCOUNTER — HOSPITAL ENCOUNTER (OUTPATIENT)
Dept: MAMMOGRAPHY | Facility: HOSPITAL | Age: 74
Discharge: HOME | End: 2024-06-17
Admitting: INTERNAL MEDICINE
Payer: MEDICARE

## 2024-06-17 DIAGNOSIS — Z12.31 VISIT FOR SCREENING MAMMOGRAM: ICD-10-CM

## 2024-06-17 PROCEDURE — 77067 SCR MAMMO BI INCL CAD: CPT

## 2024-06-17 PROCEDURE — 77063 BREAST TOMOSYNTHESIS BI: CPT

## 2024-06-27 ENCOUNTER — HOSPITAL ENCOUNTER (OUTPATIENT)
Dept: CARDIOLOGY | Facility: HOSPITAL | Age: 74
Discharge: HOME | End: 2024-06-27
Admitting: INTERNAL MEDICINE
Payer: MEDICARE

## 2024-06-27 VITALS — HEIGHT: 60 IN | WEIGHT: 167.11 LBS | BODY MASS INDEX: 32.81 KG/M2

## 2024-06-27 DIAGNOSIS — R01.1 HEART MURMUR: ICD-10-CM

## 2024-06-27 LAB
ASCENDING AORTA: 3 CM
BH CV ECHO MEAS - AO MAX PG: 32.6 MMHG
BH CV ECHO MEAS - AO MEAN PG: 22 MMHG
BH CV ECHO MEAS - AO ROOT DIAM: 3 CM
BH CV ECHO MEAS - AO V2 MAX: 285.4 CM/SEC
BH CV ECHO MEAS - AO V2 VTI: 1.1 CM
BH CV ECHO MEAS - EF(MOD-BP): 70 %
BH CV ECHO MEAS - IVS/LVPW: 1 CM
BH CV ECHO MEAS - IVSD: 0.9 CM
BH CV ECHO MEAS - LA DIMENSION: 3.8 CM
BH CV ECHO MEAS - LAT PEAK E' VEL: 8.2 CM/SEC
BH CV ECHO MEAS - LV MAX PG: 5.2 MMHG
BH CV ECHO MEAS - LV MEAN PG: 2.48 MMHG
BH CV ECHO MEAS - LV V1 MAX: 113 CM/SEC
BH CV ECHO MEAS - LV V1 VTI: 24.2 CM
BH CV ECHO MEAS - LVIDD: 4.7 CM
BH CV ECHO MEAS - LVIDS: 3 CM
BH CV ECHO MEAS - LVOT DIAM: 2 CM
BH CV ECHO MEAS - LVPWD: 0.9 CM
BH CV ECHO MEAS - MED PEAK E' VEL: 13 CM/SEC
BH CV ECHO MEAS - MV A MAX VEL: 91.6 CM/SEC
BH CV ECHO MEAS - MV DEC SLOPE: 390.1 CM/SEC2
BH CV ECHO MEAS - MV DEC TIME: 74 SEC
BH CV ECHO MEAS - MV E MAX VEL: 98.6 CM/SEC
BH CV ECHO MEAS - MV E/A: 1.08
BH CV ECHO MEAS - MV MAX PG: 3.9 MMHG
BH CV ECHO MEAS - MVA(VTI): 3 CM2
BH CV ECHO MEAS - PA ACC SLOPE: 656 CM/SEC2
BH CV ECHO MEAS - PA ACC TIME: 0.12 SEC
BH CV ECHO MEAS - RAP SYSTOLE: 3 MMHG
BH CV ECHO MEAS - RVSP: 32 MMHG
BH CV ECHO MEAS - TAPSE (>1.6): 1.7 CM
BH CV ECHO MEAS - TR MAX PG: 29.3 MMHG
BH CV ECHO MEAS - TR MAX VEL: 270.5 CM/SEC
BH CV ECHO MEASUREMENTS AVERAGE E/E' RATIO: 9.3
BH CV VAS BP LEFT ARM: NORMAL MMHG
BH CV XLRA - RV BASE: 2.1 CM
BH CV XLRA - RV LENGTH: 7 CM
BH CV XLRA - RV MID: 2.6 CM
BH CV XLRA - TDI S': 12.1 CM/SEC
IVRT: 93 MS

## 2024-06-27 PROCEDURE — 93306 TTE W/DOPPLER COMPLETE: CPT

## 2024-07-01 ENCOUNTER — TELEPHONE (OUTPATIENT)
Dept: INTERNAL MEDICINE | Facility: CLINIC | Age: 74
End: 2024-07-01
Payer: MEDICARE

## 2024-07-01 NOTE — TELEPHONE ENCOUNTER
Spoke with pt, she gave verbal understanding of results     ----- Message from Rosemarie Marley sent at 6/30/2024  7:24 AM EDT -----  Please make sure patient has seen the following mychart message:       Your echo shows mild narrowing of your aortic valve. We will continue to monitor it for now.

## 2024-07-03 ENCOUNTER — HOSPITAL ENCOUNTER (OUTPATIENT)
Dept: MAMMOGRAPHY | Facility: HOSPITAL | Age: 74
Discharge: HOME | End: 2024-07-03
Admitting: RADIOLOGY
Payer: MEDICARE

## 2024-07-03 DIAGNOSIS — R92.8 ABNORMAL MAMMOGRAM OF RIGHT BREAST: ICD-10-CM

## 2024-07-03 PROCEDURE — G0279 TOMOSYNTHESIS, MAMMO: HCPCS

## 2024-07-03 PROCEDURE — 77065 DX MAMMO INCL CAD UNI: CPT

## 2024-07-11 ENCOUNTER — TRANSCRIBE ORDERS (OUTPATIENT)
Dept: MAMMOGRAPHY | Facility: HOSPITAL | Age: 74
End: 2024-07-11
Payer: MEDICARE

## 2024-07-11 DIAGNOSIS — R92.8 ABNORMAL MAMMOGRAM: Primary | ICD-10-CM

## 2024-12-03 ENCOUNTER — OFFICE VISIT (OUTPATIENT)
Dept: INTERNAL MEDICINE | Facility: CLINIC | Age: 74
End: 2024-12-03
Payer: MEDICARE

## 2024-12-03 ENCOUNTER — LAB (OUTPATIENT)
Dept: LAB | Facility: HOSPITAL | Age: 74
End: 2024-12-03
Payer: MEDICARE

## 2024-12-03 VITALS
OXYGEN SATURATION: 97 % | TEMPERATURE: 98.1 F | SYSTOLIC BLOOD PRESSURE: 122 MMHG | HEART RATE: 73 BPM | DIASTOLIC BLOOD PRESSURE: 74 MMHG | WEIGHT: 164 LBS | BODY MASS INDEX: 32.2 KG/M2 | HEIGHT: 60 IN

## 2024-12-03 DIAGNOSIS — K76.0 NONALCOHOLIC FATTY LIVER DISEASE: ICD-10-CM

## 2024-12-03 DIAGNOSIS — E11.69 TYPE 2 DIABETES MELLITUS WITH OTHER SPECIFIED COMPLICATION, WITHOUT LONG-TERM CURRENT USE OF INSULIN: Primary | ICD-10-CM

## 2024-12-03 DIAGNOSIS — E78.2 MIXED HYPERLIPIDEMIA: ICD-10-CM

## 2024-12-03 LAB
ALBUMIN SERPL-MCNC: 4.7 G/DL (ref 3.5–5.2)
ALBUMIN/GLOB SERPL: 1.7 G/DL
ALP SERPL-CCNC: 77 U/L (ref 39–117)
ALT SERPL W P-5'-P-CCNC: 18 U/L (ref 1–33)
ANION GAP SERPL CALCULATED.3IONS-SCNC: 16 MMOL/L (ref 5–15)
AST SERPL-CCNC: 29 U/L (ref 1–32)
BILIRUB SERPL-MCNC: 0.5 MG/DL (ref 0–1.2)
BUN SERPL-MCNC: 12 MG/DL (ref 8–23)
BUN/CREAT SERPL: 14.5 (ref 7–25)
CALCIUM SPEC-SCNC: 9.9 MG/DL (ref 8.6–10.5)
CHLORIDE SERPL-SCNC: 100 MMOL/L (ref 98–107)
CHOLEST SERPL-MCNC: 129 MG/DL (ref 0–200)
CO2 SERPL-SCNC: 25 MMOL/L (ref 22–29)
CREAT SERPL-MCNC: 0.83 MG/DL (ref 0.57–1)
EGFRCR SERPLBLD CKD-EPI 2021: 73.2 ML/MIN/1.73
EXPIRATION DATE: NORMAL
GLOBULIN UR ELPH-MCNC: 2.7 GM/DL
GLUCOSE SERPL-MCNC: 85 MG/DL (ref 65–99)
HBA1C MFR BLD: 5.5 % (ref 4.5–5.7)
HDLC SERPL-MCNC: 49 MG/DL (ref 40–60)
LDLC SERPL CALC-MCNC: 53 MG/DL (ref 0–100)
LDLC/HDLC SERPL: 0.99 {RATIO}
Lab: NORMAL
POTASSIUM SERPL-SCNC: 4.1 MMOL/L (ref 3.5–5.2)
PROT SERPL-MCNC: 7.4 G/DL (ref 6–8.5)
SODIUM SERPL-SCNC: 141 MMOL/L (ref 136–145)
TRIGL SERPL-MCNC: 157 MG/DL (ref 0–150)
VLDLC SERPL-MCNC: 27 MG/DL (ref 5–40)

## 2024-12-03 PROCEDURE — 99214 OFFICE O/P EST MOD 30 MIN: CPT | Performed by: INTERNAL MEDICINE

## 2024-12-03 PROCEDURE — 80053 COMPREHEN METABOLIC PANEL: CPT

## 2024-12-03 PROCEDURE — 3074F SYST BP LT 130 MM HG: CPT | Performed by: INTERNAL MEDICINE

## 2024-12-03 PROCEDURE — 80061 LIPID PANEL: CPT

## 2024-12-03 PROCEDURE — G2211 COMPLEX E/M VISIT ADD ON: HCPCS | Performed by: INTERNAL MEDICINE

## 2024-12-03 PROCEDURE — 83036 HEMOGLOBIN GLYCOSYLATED A1C: CPT | Performed by: INTERNAL MEDICINE

## 2024-12-03 PROCEDURE — 3078F DIAST BP <80 MM HG: CPT | Performed by: INTERNAL MEDICINE

## 2024-12-03 PROCEDURE — 3044F HG A1C LEVEL LT 7.0%: CPT | Performed by: INTERNAL MEDICINE

## 2024-12-03 PROCEDURE — 1126F AMNT PAIN NOTED NONE PRSNT: CPT | Performed by: INTERNAL MEDICINE

## 2024-12-03 RX ORDER — SEMAGLUTIDE 0.68 MG/ML
0.5 INJECTION, SOLUTION SUBCUTANEOUS
COMMUNITY
Start: 2024-11-22

## 2024-12-03 NOTE — PROGRESS NOTES
Diabetes, Hypertension, and Hyperlipidemia    Subjective   Claire Lux is a 74 y.o. female is here today for follow-up.    Diabetes  Pertinent negatives for hypoglycemia include no confusion, dizziness, headaches, speech difficulty or tremors. Pertinent negatives for diabetes include no chest pain, no polydipsia and no polyuria.   Hypertension  Pertinent negatives include no chest pain, headaches, palpitations or shortness of breath.   Hyperlipidemia  Pertinent negatives include no chest pain, myalgias or shortness of breath.     Sharlene is here for a follow up on her DM2, htn and hlp. Followed by GI for SMALLS.  On ozempic for SMALLS and her DM2, and is doing very well, asking if needs to stay on ozempic long term.    Answers submitted by the patient for this visit:  Primary Reason for Visit (Submitted on 12/1/2024)  What is the primary reason for your visit?: Diabetes  Diabetes Questionnaire (Submitted on 12/1/2024)  Chief Complaint: Diabetes problem  Below 70: never        Current Outpatient Medications:     amlodipine-olmesartan (JENNI) 10-40 MG per tablet, Take 1 tablet by mouth Daily. for blood pressure, Disp: 90 tablet, Rfl: 3    Cholecalciferol (VITAMIN D) 50 MCG (2000 UT) capsule, Take 1 capsule by mouth Daily., Disp: , Rfl:     glucose blood test strip, Accu-Chek Pricila Plus In Vitro Strip; Patient Sig: Accu-Chek Pricila Plus In Vitro Strip USE TO TEST DAILY; 1; 0; 09-Nov-2012; Active, Disp: , Rfl:     metFORMIN (GLUCOPHAGE) 500 MG tablet, Take 1 tablet by mouth Daily With Breakfast., Disp: , Rfl:     metoprolol succinate XL (TOPROL-XL) 100 MG 24 hr tablet, Take 1 tablet by mouth Daily., Disp: 90 tablet, Rfl: 3    Multiple Vitamins-Minerals (MULTI VITAMIN/MINERALS PO), Take 1 tablet by mouth Daily., Disp: , Rfl:     niacin 500 MG tablet, Take 2 tablets by mouth 2 (Two) Times a Day., Disp: , Rfl:     Omega-3 Fatty Acids (EQL FISH OIL) 1000 MG capsule, Take  by mouth daily., Disp: , Rfl:     Ozempic, 0.25 or  "0.5 MG/DOSE, 2 MG/3ML solution pen-injector, Inject 0.5 doses under the skin into the appropriate area as directed Every 7 (Seven) Days., Disp: , Rfl:     pantoprazole (PROTONIX) 40 MG EC tablet, Take 1 tablet by mouth 2 (Two) Times a Day., Disp: , Rfl:     pravastatin (PRAVACHOL) 40 MG tablet, Take 1 tablet by mouth Daily., Disp: 90 tablet, Rfl: 3    psyllium (METAMUCIL) 0.52 G capsule, Take 3 capsules by mouth 2 (Two) Times a Day., Disp: , Rfl:     ursodiol (ACTIGALL) 300 MG capsule, Take 2 capsules by mouth 2 (Two) Times a Day., Disp: , Rfl:     Vitamin E 400 UNITS tablet, Take 1 tablet by mouth daily., Disp: , Rfl:       The following portions of the patient's history were reviewed and updated as appropriate: allergies, current medications, past family history, past medical history, past social history, past surgical history and problem list.    Review of Systems   Constitutional: Negative.  Positive for unexpected weight change. Negative for chills and fever.   HENT:  Negative for ear discharge, ear pain, sinus pressure and sore throat.    Respiratory:  Negative for cough, chest tightness and shortness of breath.    Cardiovascular:  Negative for chest pain, palpitations and leg swelling.   Gastrointestinal:  Negative for diarrhea, nausea and vomiting.   Endocrine: Negative for polydipsia and polyuria.   Musculoskeletal:  Negative for arthralgias, back pain and myalgias.   Neurological:  Negative for dizziness, tremors, syncope, speech difficulty and headaches.   Psychiatric/Behavioral:  Negative for confusion and sleep disturbance.        Objective   /74   Pulse 73   Temp 98.1 °F (36.7 °C)   Ht 152.4 cm (60\")   Wt 74.4 kg (164 lb)   SpO2 97% Comment: ra  BMI 32.03 kg/m²   Physical Exam  Vitals and nursing note reviewed.   Constitutional:       Appearance: She is well-developed.   HENT:      Head: Normocephalic and atraumatic.      Right Ear: External ear normal.      Left Ear: External ear normal. "      Mouth/Throat:      Pharynx: No oropharyngeal exudate.   Eyes:      Conjunctiva/sclera: Conjunctivae normal.      Pupils: Pupils are equal, round, and reactive to light.   Neck:      Thyroid: No thyromegaly.   Cardiovascular:      Rate and Rhythm: Normal rate and regular rhythm.      Pulses: Normal pulses.      Heart sounds: Murmur (belgica- 2/6) heard.      No friction rub. No gallop.   Pulmonary:      Effort: Pulmonary effort is normal.      Breath sounds: Normal breath sounds.   Abdominal:      General: Bowel sounds are normal. There is no distension.      Palpations: Abdomen is soft.      Tenderness: There is no abdominal tenderness.   Musculoskeletal:      Cervical back: Neck supple.   Skin:     General: Skin is warm and dry.   Neurological:      Mental Status: She is alert and oriented to person, place, and time.      Cranial Nerves: No cranial nerve deficit.   Psychiatric:         Judgment: Judgment normal.         BMI is >= 30 and <35. (Class 1 Obesity). The following options were offered after discussion;: exercise counseling/recommendations and nutrition counseling/recommendations       Results for orders placed or performed in visit on 12/03/24   POC Glycosylated Hemoglobin (Hb A1C)    Collection Time: 12/03/24 10:36 AM    Specimen: Blood   Result Value Ref Range    Hemoglobin A1C 5.5 4.5 - 5.7 %    Lot Number [id number]     Expiration Date 7/18/26              Assessment & Plan   Diagnoses and all orders for this visit:    Type 2 diabetes mellitus with other specified complication, without long-term current use of insulin  -     POC Glycosylated Hemoglobin (Hb A1C)    Mixed hyperlipidemia  -     Lipid Panel; Future    Nonalcoholic fatty liver disease  -     Comprehensive Metabolic Panel; Future    Other orders  -     metFORMIN (GLUCOPHAGE) 500 MG tablet; Take 1 tablet by mouth Daily With Breakfast.  -     Ozempic, 0.25 or 0.5 MG/DOSE, 2 MG/3ML solution pen-injector; Inject 0.5 doses under the skin into  the appropriate area as directed Every 7 (Seven) Days.    Continue Ozempic at current dose.  Has metformin as a backup in case Ozempic gets delayed.  Advised to also discuss with Dr. Mccall regarding long-term plans for the Ozempic.  Adv if cost goes up with the change in her mail order pharm, to let us know and we can switch the Rx.    Reviewed echo results with patient, mild aortic stenosis.  Monitor for now as she is asymptomatic and refer to cardiology if starts having weakness or shortness of breath.     Return in about 6 months (around 6/3/2025) for Medicare Wellness - pls use 2 x 15 min if needed.    Electronically signed by:    Rosemarie Marley MD

## 2024-12-22 ENCOUNTER — DOCUMENTATION (OUTPATIENT)
Dept: GENETICS | Facility: HOSPITAL | Age: 74
End: 2024-12-22
Payer: MEDICARE

## 2024-12-22 LAB
NCCN CRITERIA FLAG: ABNORMAL
TYRER CUZICK SCORE: 5.8

## 2024-12-22 NOTE — PROGRESS NOTES
This patient recently took the CARE risk assessment for a mammogram appointment. Based on the patient's responses, NCCN criteria for genetic testing was met.     Navigator follow-up:   Per the CARE risk assessment, the patient has declined genetic testing.

## 2025-02-05 ENCOUNTER — HOSPITAL ENCOUNTER (OUTPATIENT)
Dept: MAMMOGRAPHY | Facility: HOSPITAL | Age: 75
Discharge: HOME | End: 2025-02-05
Admitting: INTERNAL MEDICINE
Payer: MEDICARE

## 2025-02-05 DIAGNOSIS — R92.8 ABNORMAL MAMMOGRAM: ICD-10-CM

## 2025-02-05 PROCEDURE — G0279 TOMOSYNTHESIS, MAMMO: HCPCS | Performed by: RADIOLOGY

## 2025-02-05 PROCEDURE — 77065 DX MAMMO INCL CAD UNI: CPT

## 2025-02-05 PROCEDURE — 77065 DX MAMMO INCL CAD UNI: CPT | Performed by: RADIOLOGY

## 2025-02-05 PROCEDURE — G0279 TOMOSYNTHESIS, MAMMO: HCPCS

## 2025-04-28 DIAGNOSIS — I10 BENIGN ESSENTIAL HYPERTENSION: ICD-10-CM

## 2025-04-28 DIAGNOSIS — E78.2 MIXED HYPERLIPIDEMIA: ICD-10-CM

## 2025-04-28 RX ORDER — PRAVASTATIN SODIUM 40 MG
40 TABLET ORAL DAILY
Qty: 90 TABLET | Refills: 0 | Status: SHIPPED | OUTPATIENT
Start: 2025-04-28

## 2025-04-28 RX ORDER — METOPROLOL SUCCINATE 100 MG/1
100 TABLET, EXTENDED RELEASE ORAL DAILY
Qty: 90 TABLET | Refills: 0 | Status: SHIPPED | OUTPATIENT
Start: 2025-04-28

## 2025-06-19 DIAGNOSIS — I10 BENIGN ESSENTIAL HYPERTENSION: ICD-10-CM

## 2025-06-19 RX ORDER — AMLODIPINE AND OLMESARTAN MEDOXOMIL 10; 40 MG/1; MG/1
1 TABLET ORAL DAILY
Qty: 90 TABLET | Refills: 0 | Status: SHIPPED | OUTPATIENT
Start: 2025-06-19

## 2025-07-15 ENCOUNTER — LAB (OUTPATIENT)
Dept: LAB | Facility: HOSPITAL | Age: 75
End: 2025-07-15
Payer: MEDICARE

## 2025-07-15 ENCOUNTER — OFFICE VISIT (OUTPATIENT)
Dept: INTERNAL MEDICINE | Facility: CLINIC | Age: 75
End: 2025-07-15
Payer: MEDICARE

## 2025-07-15 VITALS
WEIGHT: 168 LBS | BODY MASS INDEX: 32.98 KG/M2 | SYSTOLIC BLOOD PRESSURE: 116 MMHG | HEIGHT: 60 IN | OXYGEN SATURATION: 100 % | DIASTOLIC BLOOD PRESSURE: 72 MMHG | HEART RATE: 72 BPM | TEMPERATURE: 98.2 F

## 2025-07-15 DIAGNOSIS — Z00.00 MEDICARE ANNUAL WELLNESS VISIT, SUBSEQUENT: Primary | ICD-10-CM

## 2025-07-15 DIAGNOSIS — K76.0 NONALCOHOLIC FATTY LIVER DISEASE: ICD-10-CM

## 2025-07-15 DIAGNOSIS — K83.8 DILATED CBD, ACQUIRED: ICD-10-CM

## 2025-07-15 DIAGNOSIS — E11.69 TYPE 2 DIABETES MELLITUS WITH OTHER SPECIFIED COMPLICATION, WITHOUT LONG-TERM CURRENT USE OF INSULIN: ICD-10-CM

## 2025-07-15 DIAGNOSIS — E78.2 MIXED HYPERLIPIDEMIA: ICD-10-CM

## 2025-07-15 DIAGNOSIS — E55.9 VITAMIN D DEFICIENCY: ICD-10-CM

## 2025-07-15 DIAGNOSIS — I10 BENIGN ESSENTIAL HYPERTENSION: ICD-10-CM

## 2025-07-15 DIAGNOSIS — Z78.0 POSTMENOPAUSAL: ICD-10-CM

## 2025-07-15 LAB
25(OH)D3 SERPL-MCNC: 93.1 NG/ML (ref 30–100)
CHOLEST SERPL-MCNC: 125 MG/DL (ref 0–200)
EXPIRATION DATE: NORMAL
EXPIRATION DATE: NORMAL
HBA1C MFR BLD: 5.4 % (ref 4.5–5.7)
HDLC SERPL-MCNC: 46 MG/DL (ref 40–60)
LDLC SERPL CALC-MCNC: 53 MG/DL (ref 0–100)
LDLC/HDLC SERPL: 1.04 {RATIO}
Lab: NORMAL
Lab: NORMAL
POC ALBUMIN, URINE: 10 MG/L
POC CREATININE, URINE: 10 MG/DL
POC URINE ALB/CREA RATIO: <30
TRIGL SERPL-MCNC: 155 MG/DL (ref 0–150)
TSH SERPL DL<=0.05 MIU/L-ACNC: 2.34 UIU/ML (ref 0.27–4.2)
VIT B12 BLD-MCNC: 702 PG/ML (ref 211–946)
VLDLC SERPL-MCNC: 26 MG/DL (ref 5–40)

## 2025-07-15 PROCEDURE — 84443 ASSAY THYROID STIM HORMONE: CPT

## 2025-07-15 PROCEDURE — 82607 VITAMIN B-12: CPT

## 2025-07-15 PROCEDURE — 83036 HEMOGLOBIN GLYCOSYLATED A1C: CPT | Performed by: INTERNAL MEDICINE

## 2025-07-15 PROCEDURE — 1126F AMNT PAIN NOTED NONE PRSNT: CPT | Performed by: INTERNAL MEDICINE

## 2025-07-15 PROCEDURE — 82570 ASSAY OF URINE CREATININE: CPT | Performed by: INTERNAL MEDICINE

## 2025-07-15 PROCEDURE — 3044F HG A1C LEVEL LT 7.0%: CPT | Performed by: INTERNAL MEDICINE

## 2025-07-15 PROCEDURE — 82044 UR ALBUMIN SEMIQUANTITATIVE: CPT | Performed by: INTERNAL MEDICINE

## 2025-07-15 PROCEDURE — 99397 PER PM REEVAL EST PAT 65+ YR: CPT | Performed by: INTERNAL MEDICINE

## 2025-07-15 PROCEDURE — 82306 VITAMIN D 25 HYDROXY: CPT

## 2025-07-15 PROCEDURE — 3074F SYST BP LT 130 MM HG: CPT | Performed by: INTERNAL MEDICINE

## 2025-07-15 PROCEDURE — 1160F RVW MEDS BY RX/DR IN RCRD: CPT | Performed by: INTERNAL MEDICINE

## 2025-07-15 PROCEDURE — 1159F MED LIST DOCD IN RCRD: CPT | Performed by: INTERNAL MEDICINE

## 2025-07-15 PROCEDURE — 93000 ELECTROCARDIOGRAM COMPLETE: CPT | Performed by: INTERNAL MEDICINE

## 2025-07-15 PROCEDURE — 3078F DIAST BP <80 MM HG: CPT | Performed by: INTERNAL MEDICINE

## 2025-07-15 PROCEDURE — 80061 LIPID PANEL: CPT

## 2025-07-15 PROCEDURE — G0439 PPPS, SUBSEQ VISIT: HCPCS | Performed by: INTERNAL MEDICINE

## 2025-07-15 RX ORDER — PRAVASTATIN SODIUM 40 MG
40 TABLET ORAL DAILY
Qty: 90 TABLET | Refills: 3 | Status: SHIPPED | OUTPATIENT
Start: 2025-07-15

## 2025-07-15 RX ORDER — METOPROLOL SUCCINATE 100 MG/1
100 TABLET, EXTENDED RELEASE ORAL DAILY
Qty: 90 TABLET | Refills: 3 | Status: SHIPPED | OUTPATIENT
Start: 2025-07-15

## 2025-07-15 RX ORDER — AMLODIPINE AND OLMESARTAN MEDOXOMIL 10; 40 MG/1; MG/1
1 TABLET ORAL DAILY
Qty: 90 TABLET | Refills: 3 | Status: SHIPPED | OUTPATIENT
Start: 2025-07-15

## 2025-07-15 NOTE — PROGRESS NOTES
Subjective   The ABCs of the Annual Wellness Visit  Medicare Wellness Visit      Claire Lux is a 74 y.o. patient who presents for a Medicare Wellness Visit.    The following portions of the patient's history were reviewed and   updated as appropriate: allergies, current medications, past family history, past medical history, past social history, past surgical history, and problem list.    Compared to one year ago, the patient's physical   health is the same.  Compared to one year ago, the patient's mental   health is the same.    Recent Hospitalizations:  She was not admitted to the hospital during the last year.     Current Medical Providers:  Patient Care Team:  Rosemarie Marley MD as PCP - Claire Wilson MD as Consulting Physician (Gastroenterology)    Outpatient Medications Prior to Visit   Medication Sig Dispense Refill    Cholecalciferol (VITAMIN D) 50 MCG (2000 UT) capsule Take 1 capsule by mouth Daily.      glucose blood test strip Accu-Chek Rpicila Plus In Vitro Strip; Patient Sig: Accu-Chek Pricila Plus In Vitro Strip USE TO TEST DAILY; 1; 0; 09-Nov-2012; Active      Multiple Vitamins-Minerals (MULTI VITAMIN/MINERALS PO) Take 1 tablet by mouth Daily.      niacin 500 MG tablet Take 2 tablets by mouth 2 (Two) Times a Day.      Omega-3 Fatty Acids (EQL FISH OIL) 1000 MG capsule Take  by mouth daily.      Ozempic, 0.25 or 0.5 MG/DOSE, 2 MG/3ML solution pen-injector Inject 0.5 doses under the skin into the appropriate area as directed Every 7 (Seven) Days.      pantoprazole (PROTONIX) 40 MG EC tablet Take 1 tablet by mouth 2 (Two) Times a Day.      psyllium (METAMUCIL) 0.52 G capsule Take 3 capsules by mouth 2 (Two) Times a Day.      ursodiol (ACTIGALL) 300 MG capsule Take 2 capsules by mouth 2 (Two) Times a Day.      Vitamin E 400 UNITS tablet Take 1 tablet by mouth daily.      amlodipine-olmesartan (JENNI) 10-40 MG per tablet TAKE 1 TABLET DAILY FOR    BLOOD PRESSURE 90 tablet 0     "metoprolol succinate XL (TOPROL-XL) 100 MG 24 hr tablet TAKE 1 TABLET DAILY 90 tablet 0    pravastatin (PRAVACHOL) 40 MG tablet TAKE 1 TABLET DAILY 90 tablet 0    metFORMIN (GLUCOPHAGE) 500 MG tablet Take 1 tablet by mouth Daily With Breakfast.       No facility-administered medications prior to visit.     No opioid medication identified on active medication list. I have reviewed chart for other potential  high risk medication/s and harmful drug interactions in the elderly.      Aspirin is not on active medication list.  Aspirin use is not indicated based on review of current medical condition/s. Risk of harm outweighs potential benefits.  .    Patient Active Problem List   Diagnosis    Abnormal weight gain    Benign essential hypertension    Hypercalcemia    Hyperlipidemia    Nonalcoholic fatty liver disease    Type 2 diabetes mellitus    Postmenopausal disorder     Advance Care Planning Advance Directive is on file.  ACP discussion was held with the patient during this visit. Patient has an advance directive in EMR which is still valid.             Objective   Vitals:    07/15/25 1105   BP: 116/72   Pulse: 72   Temp: 98.2 °F (36.8 °C)   SpO2: 100%  Comment: ra   Weight: 76.2 kg (168 lb)   Height: 152.4 cm (60\")   PainSc: 0-No pain       Estimated body mass index is 32.81 kg/m² as calculated from the following:    Height as of this encounter: 152.4 cm (60\").    Weight as of this encounter: 76.2 kg (168 lb).                Does the patient have evidence of cognitive impairment? No  Lab Results   Component Value Date    HGBA1C 5.4 07/15/2025       ECG 12 Lead    Date/Time: 7/15/2025 12:07 PM  Performed by: Rosemarie Marley MD    Authorized by: Rosemarie Marley MD  Comparison: compared with previous ECG from 5/24/2024  Similar to previous ECG  Rhythm: sinus rhythm  Rate: normal  Conduction: conduction normal  Conduction: 1st degree AV block  ST Segments: ST segments normal  T Waves: T waves normal  QRS axis: " left  Other: no other findings  Other findings: non-specific ST-T wave changes    Clinical impression: abnormal EKG                                                                                                Health  Risk Assessment    Smoking Status:  Social History     Tobacco Use   Smoking Status Never   Smokeless Tobacco Never     Alcohol Consumption:  Social History     Substance and Sexual Activity   Alcohol Use No       Fall Risk Screen  ANNAADI Fall Risk Assessment was completed, and patient is at LOW risk for falls.Assessment completed on:7/15/2025    Depression Screening   Little interest or pleasure in doing things? Not at all   Feeling down, depressed, or hopeless? Not at all   PHQ-2 Total Score 0      Health Habits and Functional and Cognitive Screenin/8/2025    11:29 AM   Functional & Cognitive Status   Do you have difficulty preparing food and eating? No   Do you have difficulty bathing yourself, getting dressed or grooming yourself? No   Do you have difficulty using the toilet? No   Do you have difficulty moving around from place to place? No   Do you have trouble with steps or getting out of a bed or a chair? No   Current Diet Diabetic Diet   Dental Exam Not up to date   Eye Exam Up to date   Exercise (times per week) 3 times per week   Current Exercises Include No Regular Exercise;Gardening;Walking   Do you need help using the phone?  No   Are you deaf or do you have serious difficulty hearing?  No   Do you need help to go to places out of walking distance? No   Do you need help shopping? No   Do you need help preparing meals?  No   Do you need help with housework?  No   Do you need help with laundry? No   Do you need help taking your medications? No   Do you need help managing money? No   Do you ever drive or ride in a car without wearing a seat belt? No   Have you felt unusual fatigue (could be tiredness), stress, anger or loneliness in the last month? No   Who do you live with? Sibling    If you need help, do you have trouble finding someone available to you? No   Have you been bothered in the last four weeks by sexual problems? No   Do you have difficulty concentrating, remembering or making decisions? No           Age-appropriate Screening Schedule:  Refer to the list below for future screening recommendations based on patient's age, sex and/or medical conditions. Orders for these recommended tests are listed in the plan section. The patient has been provided with a written plan.    Health Maintenance List  Health Maintenance   Topic Date Due    TDAP/TD VACCINES (1 - Tdap) Never done    ZOSTER VACCINE (2 of 2) 11/16/2017    DXA SCAN  01/12/2025    DIABETIC FOOT EXAM  05/24/2025    DIABETIC EYE EXAM  08/14/2025    COVID-19 Vaccine (9 - 2024-25 season) 07/29/2025 (Originally 4/7/2025)    RSV Vaccine - Adults (1 - 1-dose 75+ series) 07/15/2026 (Originally 10/24/2023)    INFLUENZA VACCINE  10/01/2025    LIPID PANEL  12/03/2025    HEMOGLOBIN A1C  01/15/2026    ANNUAL WELLNESS VISIT  07/15/2026    URINE MICROALBUMIN-CREATININE RATIO (uACR)  07/15/2026    COLORECTAL CANCER SCREENING  05/04/2031    Hepatitis B  Completed    Pneumococcal Vaccine 50+  Completed    HEPATITIS C SCREENING  Addressed    MAMMOGRAM  Discontinued                                                                                                                                                CMS Preventative Services Quick Reference  Risk Factors Identified During Encounter  Immunizations Discussed/Encouraged: Tdap and RSV (Respiratory Syncytial Virus)    The above risks/problems have been discussed with the patient.  Pertinent information has been shared with the patient in the After Visit Summary.  An After Visit Summary and PPPS were made available to the patient.    Follow Up:   Next Medicare Wellness visit to be scheduled in 1 year.         Additional E&M Note during same encounter follows:  Patient has additional, significant,  "and separately identifiable condition(s)/problem(s) that require work above and beyond the Medicare Wellness Visit     Chief Complaint  Medicare Wellness-subsequent    Subjective   HPI  Claire is also being seen today for an annual adult preventative physical exam.   S/p Liver US by Dr. Mccall- wnl.  Scheduled for mamm in Aug, will add dexa.  Review of Systems   Constitutional:  Negative for chills and fever.   HENT:  Negative for congestion, ear pain and sore throat.    Eyes:  Negative for pain, redness and visual disturbance.   Respiratory:  Negative for cough and shortness of breath.    Cardiovascular:  Negative for chest pain, palpitations and leg swelling.   Gastrointestinal:  Negative for abdominal pain, diarrhea and nausea.   Endocrine: Negative for cold intolerance and heat intolerance.   Genitourinary:  Negative for flank pain and urgency.   Musculoskeletal:  Negative for arthralgias and gait problem.   Skin:  Negative for pallor and rash.   Neurological:  Negative for dizziness and weakness.   Psychiatric/Behavioral:  Negative for dysphoric mood and sleep disturbance. The patient is not nervous/anxious.               Objective   Vital Signs:  /72   Pulse 72   Temp 98.2 °F (36.8 °C)   Ht 152.4 cm (60\")   Wt 76.2 kg (168 lb)   SpO2 100% Comment: ra  BMI 32.81 kg/m²   Physical Exam  Constitutional:       General: She is not in acute distress.     Appearance: Normal appearance.   HENT:      Head: Normocephalic and atraumatic.      Right Ear: Tympanic membrane and external ear normal.      Left Ear: Tympanic membrane and external ear normal.      Nose: Nose normal.      Mouth/Throat:      Mouth: Mucous membranes are moist.   Eyes:      General: No scleral icterus.  Neck:      Vascular: No carotid bruit.   Cardiovascular:      Rate and Rhythm: Normal rate and regular rhythm.      Pulses: Normal pulses.      Heart sounds: Normal heart sounds. No murmur heard.     No friction rub. No gallop. "   Pulmonary:      Effort: Pulmonary effort is normal.      Breath sounds: Normal breath sounds. No rhonchi or rales.   Abdominal:      General: Bowel sounds are normal. There is no distension.      Palpations: Abdomen is soft.      Tenderness: There is no right CVA tenderness, left CVA tenderness, guarding or rebound.      Hernia: No hernia is present.   Musculoskeletal:         General: No tenderness. Normal range of motion.      Cervical back: Normal range of motion.      Right lower leg: No edema.      Left lower leg: No edema.      Right foot: No deformity.      Left foot: No deformity.   Feet:      Right foot:      Skin integrity: Skin integrity normal.      Left foot:      Skin integrity: Skin integrity normal.      Comments: Diabetic Foot Exam Performed and Monofilament Test Performed- wnl     Lymphadenopathy:      Cervical: No cervical adenopathy.   Skin:     General: Skin is warm.      Findings: No rash.   Neurological:      General: No focal deficit present.      Mental Status: She is alert and oriented to person, place, and time. Mental status is at baseline.      Cranial Nerves: No cranial nerve deficit.      Sensory: No sensory deficit.      Coordination: Coordination normal.      Gait: Gait normal.      Deep Tendon Reflexes: Reflexes normal.      Comments: Balance wnl   Psychiatric:         Mood and Affect: Mood normal.         Behavior: Behavior normal.         The following data was reviewed by: Rosemarie Marley MD on 07/15/2025:           Assessment and Plan       Current Outpatient Medications:     amlodipine-olmesartan (JENNI) 10-40 MG per tablet, Take 1 tablet by mouth Daily. for blood pressure, Disp: 90 tablet, Rfl: 3    Cholecalciferol (VITAMIN D) 50 MCG (2000 UT) capsule, Take 1 capsule by mouth Daily., Disp: , Rfl:     glucose blood test strip, Accu-Chek Pricila Plus In Vitro Strip; Patient Sig: Accu-Chek Pricila Plus In Vitro Strip USE TO TEST DAILY; 1; 0; 09-Nov-2012; Active, Disp: , Rfl:      "metoprolol succinate XL (TOPROL-XL) 100 MG 24 hr tablet, Take 1 tablet by mouth Daily., Disp: 90 tablet, Rfl: 3    Multiple Vitamins-Minerals (MULTI VITAMIN/MINERALS PO), Take 1 tablet by mouth Daily., Disp: , Rfl:     niacin 500 MG tablet, Take 2 tablets by mouth 2 (Two) Times a Day., Disp: , Rfl:     Omega-3 Fatty Acids (EQL FISH OIL) 1000 MG capsule, Take  by mouth daily., Disp: , Rfl:     Ozempic, 0.25 or 0.5 MG/DOSE, 2 MG/3ML solution pen-injector, Inject 0.5 doses under the skin into the appropriate area as directed Every 7 (Seven) Days., Disp: , Rfl:     pantoprazole (PROTONIX) 40 MG EC tablet, Take 1 tablet by mouth 2 (Two) Times a Day., Disp: , Rfl:     pravastatin (PRAVACHOL) 40 MG tablet, Take 1 tablet by mouth Daily., Disp: 90 tablet, Rfl: 3    psyllium (METAMUCIL) 0.52 G capsule, Take 3 capsules by mouth 2 (Two) Times a Day., Disp: , Rfl:     ursodiol (ACTIGALL) 300 MG capsule, Take 2 capsules by mouth 2 (Two) Times a Day., Disp: , Rfl:     Vitamin E 400 UNITS tablet, Take 1 tablet by mouth daily., Disp: , Rfl:       The following portions of the patient's history were reviewed and updated as appropriate: allergies, current medications, past family history, past medical history, past social history, past surgical history and problem list.       Objective   /72   Pulse 72   Temp 98.2 °F (36.8 °C)   Ht 152.4 cm (60\")   Wt 76.2 kg (168 lb)   SpO2 100% Comment: ra  BMI 32.81 kg/m²         Results for orders placed or performed in visit on 07/15/25   POC Glycosylated Hemoglobin (Hb A1C)    Collection Time: 07/15/25 11:32 AM    Specimen: Blood   Result Value Ref Range    Hemoglobin A1C 5.4 4.5 - 5.7 %    Lot Number [id number]     Expiration Date 2/21/27    POC Albumin/Creatinine Ratio Urine    Collection Time: 07/15/25 11:33 AM    Specimen: Urine   Result Value Ref Range    POC ALBUMIN, URINE 10 mg/L    POC CREATININE, URINE 10 mg/dL    POC Urine Albumin Creatinine Ratio <30 <30    Lot Number " [id number]     Expiration Date 8/9/26              Assessment & Plan   Diagnoses and all orders for this visit:    Medicare annual wellness visit, subsequent    Type 2 diabetes mellitus with other specified complication, without long-term current use of insulin  -     POC Glycosylated Hemoglobin (Hb A1C)  -     POC Albumin/Creatinine Ratio Urine    Mixed hyperlipidemia  -     pravastatin (PRAVACHOL) 40 MG tablet; Take 1 tablet by mouth Daily.  -     Lipid Panel; Future  -     TSH Rfx On Abnormal To Free T4; Future    Benign essential hypertension  -     metoprolol succinate XL (TOPROL-XL) 100 MG 24 hr tablet; Take 1 tablet by mouth Daily.  -     amlodipine-olmesartan (JENNI) 10-40 MG per tablet; Take 1 tablet by mouth Daily. for blood pressure    Dilated cbd, acquired  Comments:  with stones- monitored by GI. reiterated ER eval if having sxs.    Vitamin D deficiency  -     Vitamin B12; Future  -     Vitamin D,25-Hydroxy; Future    Nonalcoholic fatty liver disease    Postmenopausal  -     DEXA Bone Density Axial; Future    Other orders  -     ECG 12 Lead      S/p labs - CBC, CMP, AFP, PT. per Dr. Mccall.  MELD score was still low.  Ultrasound with CBD stones , patient was advised to call or go to the ER if started having abdominal pain         PHQ-2/PHQ-9 Depression screening reviewed with patient . Total time spent today for depression screening  with Claire Lux  was 15 minutes in counseling, along with plans for any diagnositc work-up and treatment.    Advice and education were given regarding cardiovascular risk reduction, healthy dietary habits, Seatbelt and helmet use and self skin examination.          Electronically signed by:    Rosemarie Marley MD                   Follow Up   Return in about 6 months (around 1/15/2026) for Next scheduled follow up and wellness 1 year.  Patient was given instructions and counseling regarding her condition or for health maintenance advice. Please see specific  information pulled into the AVS if appropriate.

## 2025-08-06 ENCOUNTER — HOSPITAL ENCOUNTER (OUTPATIENT)
Dept: MAMMOGRAPHY | Facility: HOSPITAL | Age: 75
Discharge: HOME | End: 2025-08-06
Admitting: RADIOLOGY
Payer: MEDICARE

## 2025-08-06 DIAGNOSIS — R92.8 ABNORMAL MAMMOGRAM: ICD-10-CM

## 2025-08-06 PROCEDURE — 77066 DX MAMMO INCL CAD BI: CPT

## 2025-08-06 PROCEDURE — G0279 TOMOSYNTHESIS, MAMMO: HCPCS

## 2025-08-20 ENCOUNTER — HOSPITAL ENCOUNTER (OUTPATIENT)
Dept: BONE DENSITY | Facility: HOSPITAL | Age: 75
Discharge: HOME | End: 2025-08-20
Admitting: INTERNAL MEDICINE
Payer: MEDICARE

## 2025-08-20 DIAGNOSIS — Z78.0 POSTMENOPAUSAL: ICD-10-CM

## 2025-08-20 PROCEDURE — 77080 DXA BONE DENSITY AXIAL: CPT
